# Patient Record
Sex: MALE | Race: WHITE | Employment: FULL TIME | URBAN - METROPOLITAN AREA
[De-identification: names, ages, dates, MRNs, and addresses within clinical notes are randomized per-mention and may not be internally consistent; named-entity substitution may affect disease eponyms.]

---

## 2019-03-01 PROCEDURE — 88305 TISSUE EXAM BY PATHOLOGIST: CPT | Performed by: INTERNAL MEDICINE

## 2024-08-06 RX ORDER — DUTASTERIDE 0.5 MG/1
0.5 CAPSULE, LIQUID FILLED ORAL NIGHTLY
COMMUNITY
Start: 2024-07-30

## 2024-08-07 ENCOUNTER — EKG ENCOUNTER (OUTPATIENT)
Dept: LAB | Age: 69
End: 2024-08-07
Attending: UROLOGY
Payer: MEDICARE

## 2024-08-07 ENCOUNTER — LABORATORY ENCOUNTER (OUTPATIENT)
Dept: LAB | Age: 69
End: 2024-08-07
Attending: UROLOGY
Payer: MEDICARE

## 2024-08-07 DIAGNOSIS — N40.1 BENIGN LOCALIZED HYPERPLASIA OF PROSTATE WITH URINARY OBSTRUCTION AND LOWER URINARY TRACT SYMPTOMS: ICD-10-CM

## 2024-08-07 DIAGNOSIS — N13.9 BENIGN LOCALIZED HYPERPLASIA OF PROSTATE WITH URINARY OBSTRUCTION AND LOWER URINARY TRACT SYMPTOMS: ICD-10-CM

## 2024-08-07 LAB
ALBUMIN SERPL-MCNC: 4.5 G/DL (ref 3.2–4.8)
ALBUMIN/GLOB SERPL: 1.7 {RATIO} (ref 1–2)
ALP LIVER SERPL-CCNC: 71 U/L
ALT SERPL-CCNC: 30 U/L
ANION GAP SERPL CALC-SCNC: 6 MMOL/L (ref 0–18)
ANTIBODY SCREEN: NEGATIVE
APTT PPP: 27.3 SECONDS (ref 23–36)
AST SERPL-CCNC: 24 U/L (ref ?–34)
ATRIAL RATE: 58 BPM
BASOPHILS # BLD AUTO: 0.03 X10(3) UL (ref 0–0.2)
BASOPHILS NFR BLD AUTO: 0.7 %
BILIRUB SERPL-MCNC: 0.7 MG/DL (ref 0.2–1.1)
BUN BLD-MCNC: 15 MG/DL (ref 9–23)
CALCIUM BLD-MCNC: 9.7 MG/DL (ref 8.7–10.4)
CHLORIDE SERPL-SCNC: 106 MMOL/L (ref 98–112)
CO2 SERPL-SCNC: 26 MMOL/L (ref 21–32)
CREAT BLD-MCNC: 0.93 MG/DL
EGFRCR SERPLBLD CKD-EPI 2021: 90 ML/MIN/1.73M2 (ref 60–?)
EOSINOPHIL # BLD AUTO: 0.07 X10(3) UL (ref 0–0.7)
EOSINOPHIL NFR BLD AUTO: 1.6 %
ERYTHROCYTE [DISTWIDTH] IN BLOOD BY AUTOMATED COUNT: 12.4 %
FASTING STATUS PATIENT QL REPORTED: YES
GLOBULIN PLAS-MCNC: 2.6 G/DL (ref 2–3.5)
GLUCOSE BLD-MCNC: 103 MG/DL (ref 70–99)
HCT VFR BLD AUTO: 45.4 %
HGB BLD-MCNC: 15.4 G/DL
IMM GRANULOCYTES # BLD AUTO: 0.01 X10(3) UL (ref 0–1)
IMM GRANULOCYTES NFR BLD: 0.2 %
INR BLD: 0.91 (ref 0.8–1.2)
LYMPHOCYTES # BLD AUTO: 1.25 X10(3) UL (ref 1–4)
LYMPHOCYTES NFR BLD AUTO: 27.7 %
MCH RBC QN AUTO: 31 PG (ref 26–34)
MCHC RBC AUTO-ENTMCNC: 33.9 G/DL (ref 31–37)
MCV RBC AUTO: 91.5 FL
MONOCYTES # BLD AUTO: 0.41 X10(3) UL (ref 0.1–1)
MONOCYTES NFR BLD AUTO: 9.1 %
NEUTROPHILS # BLD AUTO: 2.74 X10 (3) UL (ref 1.5–7.7)
NEUTROPHILS # BLD AUTO: 2.74 X10(3) UL (ref 1.5–7.7)
NEUTROPHILS NFR BLD AUTO: 60.7 %
OSMOLALITY SERPL CALC.SUM OF ELEC: 287 MOSM/KG (ref 275–295)
P AXIS: -22 DEGREES
P-R INTERVAL: 156 MS
PLATELET # BLD AUTO: 179 10(3)UL (ref 150–450)
POTASSIUM SERPL-SCNC: 4.4 MMOL/L (ref 3.5–5.1)
PROT SERPL-MCNC: 7.1 G/DL (ref 5.7–8.2)
PROTHROMBIN TIME: 12.2 SECONDS (ref 11.6–14.8)
Q-T INTERVAL: 428 MS
QRS DURATION: 88 MS
QTC CALCULATION (BEZET): 420 MS
R AXIS: 40 DEGREES
RBC # BLD AUTO: 4.96 X10(6)UL
RH BLOOD TYPE: POSITIVE
SODIUM SERPL-SCNC: 138 MMOL/L (ref 136–145)
T AXIS: 16 DEGREES
VENTRICULAR RATE: 58 BPM
WBC # BLD AUTO: 4.5 X10(3) UL (ref 4–11)

## 2024-08-07 PROCEDURE — 85610 PROTHROMBIN TIME: CPT

## 2024-08-07 PROCEDURE — 85025 COMPLETE CBC W/AUTO DIFF WBC: CPT

## 2024-08-07 PROCEDURE — 93010 ELECTROCARDIOGRAM REPORT: CPT | Performed by: INTERNAL MEDICINE

## 2024-08-07 PROCEDURE — 86850 RBC ANTIBODY SCREEN: CPT

## 2024-08-07 PROCEDURE — 93005 ELECTROCARDIOGRAM TRACING: CPT

## 2024-08-07 PROCEDURE — 85730 THROMBOPLASTIN TIME PARTIAL: CPT

## 2024-08-07 PROCEDURE — 36415 COLL VENOUS BLD VENIPUNCTURE: CPT

## 2024-08-07 PROCEDURE — 86900 BLOOD TYPING SEROLOGIC ABO: CPT

## 2024-08-07 PROCEDURE — 86901 BLOOD TYPING SEROLOGIC RH(D): CPT

## 2024-08-07 PROCEDURE — 80053 COMPREHEN METABOLIC PANEL: CPT

## 2024-08-13 ENCOUNTER — ANESTHESIA EVENT (OUTPATIENT)
Dept: SURGERY | Facility: HOSPITAL | Age: 69
End: 2024-08-13
Payer: MEDICARE

## 2024-08-13 NOTE — ANESTHESIA PREPROCEDURE EVALUATION
PRE-OP EVALUATION    Patient Name: Troy Vidal    Admit Diagnosis: BENIGN PROSTATIC HYPERPLASIA WITH OBSTRUCTION/LOWER URINARY TRACT SYMPTOMS, URINARY RETENTION    Pre-op Diagnosis: BENIGN PROSTATIC HYPERPLASIA WITH OBSTRUCTION/LOWER URINARY TRACT SYMPTOMS, URINARY RETENTION    XI ROBOT-ASSISTED LAPAROSCOPIC SIMPLE PROSTATECTOMY    Anesthesia Procedure: XI ROBOT-ASSISTED LAPAROSCOPIC SIMPLE PROSTATECTOMY    Surgeons and Role:     * Ben Ramires MD - Primary    Pre-op vitals reviewed.  Temp: 98.6 °F (37 °C)  Pulse: 65  Resp: 16  BP: 148/68  SpO2: 98 %  Body mass index is 29.6 kg/m².    Current medications reviewed.  Hospital Medications:  • acetaminophen (Tylenol Extra Strength) tab 1,000 mg  1,000 mg Oral Once   • lactated ringers infusion   Intravenous Continuous   • [COMPLETED] enoxaparin (Lovenox) 40 MG/0.4ML SUBQ injection 40 mg  40 mg Subcutaneous Once   • ceFAZolin (Ancef) 2g in 10mL IV syringe premix  2 g Intravenous Once       Outpatient Medications:     Medications Prior to Admission   Medication Sig Dispense Refill Last Dose   • tamsulosin 0.4 MG Oral Cap Take 1 capsule (0.4 mg total) by mouth daily.   8/13/2024 at 1900   • dutasteride 0.5 MG Oral Cap Take 1 capsule (0.5 mg total) by mouth nightly.   8/13/2024 at 1900   • Sildenafil Citrate (VIAGRA) 100 MG Oral Tab Take 1 tablet by mouth daily as needed for Erectile Dysfunction. 8 tablet 11        Allergies: Patient has no known allergies.      Anesthesia Evaluation    Patient summary reviewed.    Anesthetic Complications  (-) history of anesthetic complications         GI/Hepatic/Renal    Negative GI/hepatic/renal ROS.                             Cardiovascular      ECG reviewed.  Exercise tolerance: good         (+) obesity     (+) hyperlipidemia                                  Endo/Other    Negative endo/other ROS.                              Pulmonary    Negative pulmonary ROS.                       Neuro/Psych    Negative neuro/psych ROS.                                 Past Surgical History:   Procedure Laterality Date   • Colonoscopy N/A 3/1/2019    Procedure: COLONOSCOPY, POSSIBLE BIOPSY, POSSIBLE POLYPECTOMY 35435;  Surgeon: Sha Lord MD;  Location: [facility]   • Other surgical history  3/2/10    prostate bx-Dr. Love   • Skin surgery  2/8/12    MMS to Left malar cheek for BCC 2/8/12     Social History     Socioeconomic History   • Marital status:    Tobacco Use   • Smoking status: Never   • Smokeless tobacco: Never   Vaping Use   • Vaping status: Never Used   Substance and Sexual Activity   • Alcohol use: Yes     Comment: social   • Drug use: No     History   Drug Use No     Available pre-op labs reviewed.  Lab Results   Component Value Date    WBC 4.5 08/07/2024    RBC 4.96 08/07/2024    HGB 15.4 08/07/2024    HCT 45.4 08/07/2024    MCV 91.5 08/07/2024    MCH 31.0 08/07/2024    MCHC 33.9 08/07/2024    RDW 12.4 08/07/2024    .0 08/07/2024     Lab Results   Component Value Date     08/07/2024    K 4.4 08/07/2024     08/07/2024    CO2 26.0 08/07/2024    BUN 15 08/07/2024    CREATSERUM 0.93 08/07/2024     (H) 08/07/2024    CA 9.7 08/07/2024     Lab Results   Component Value Date    INR 0.91 08/07/2024         Airway      Mallampati: II  Mouth opening: 3 FB  TM distance: 4 - 6 cm  Neck ROM: full Cardiovascular    Cardiovascular exam normal.  Rhythm: regular  Rate: normal  (-) murmur   Dental    Dentition appears grossly intact         Pulmonary    Pulmonary exam normal.  Breath sounds clear to auscultation bilaterally.               Other findings        ASA: 2   Plan: general  NPO status verified and patient meets guidelines.    Post-procedure pain management plan discussed with surgeon and patient.      Plan/risks discussed with: patient and spouse            Present on Admission:  **None**

## 2024-08-14 ENCOUNTER — APPOINTMENT (OUTPATIENT)
Dept: GENERAL RADIOLOGY | Facility: HOSPITAL | Age: 69
End: 2024-08-14
Attending: NURSE PRACTITIONER
Payer: MEDICARE

## 2024-08-14 ENCOUNTER — HOSPITAL ENCOUNTER (INPATIENT)
Facility: HOSPITAL | Age: 69
LOS: 2 days | Discharge: HOME | End: 2024-08-16
Attending: UROLOGY | Admitting: UROLOGY
Payer: MEDICARE

## 2024-08-14 ENCOUNTER — ANESTHESIA (OUTPATIENT)
Dept: SURGERY | Facility: HOSPITAL | Age: 69
End: 2024-08-14
Payer: MEDICARE

## 2024-08-14 DIAGNOSIS — N13.9 BENIGN LOCALIZED HYPERPLASIA OF PROSTATE WITH URINARY OBSTRUCTION AND LOWER URINARY TRACT SYMPTOMS: Primary | ICD-10-CM

## 2024-08-14 DIAGNOSIS — N40.1 BENIGN LOCALIZED HYPERPLASIA OF PROSTATE WITH URINARY OBSTRUCTION AND LOWER URINARY TRACT SYMPTOMS: Primary | ICD-10-CM

## 2024-08-14 LAB
ALBUMIN SERPL-MCNC: 3.4 G/DL (ref 3.2–4.8)
ALBUMIN/GLOB SERPL: 1.5 {RATIO} (ref 1–2)
ALP LIVER SERPL-CCNC: 55 U/L
ALT SERPL-CCNC: 20 U/L
ANION GAP SERPL CALC-SCNC: 10 MMOL/L (ref 0–18)
ANION GAP SERPL CALC-SCNC: 10 MMOL/L (ref 0–18)
ARTERIAL PATENCY WRIST A: POSITIVE
AST SERPL-CCNC: 24 U/L (ref ?–34)
BASE EXCESS BLDA CALC-SCNC: -1.5 MMOL/L (ref ?–2)
BILIRUB SERPL-MCNC: 0.7 MG/DL (ref 0.2–1.1)
BODY TEMPERATURE: 98.6 F
BUN BLD-MCNC: 9 MG/DL (ref 9–23)
BUN BLD-MCNC: 9 MG/DL (ref 9–23)
CA-I BLD-SCNC: 1.1 MMOL/L (ref 0.95–1.32)
CALCIUM BLD-MCNC: 8.5 MG/DL (ref 8.7–10.4)
CALCIUM BLD-MCNC: 8.5 MG/DL (ref 8.7–10.4)
CHLORIDE SERPL-SCNC: 106 MMOL/L (ref 98–112)
CHLORIDE SERPL-SCNC: 106 MMOL/L (ref 98–112)
CO2 SERPL-SCNC: 21 MMOL/L (ref 21–32)
CO2 SERPL-SCNC: 21 MMOL/L (ref 21–32)
COHGB MFR BLD: 1.1 % SAT (ref 0–3)
CREAT BLD-MCNC: 1 MG/DL
CREAT BLD-MCNC: 1 MG/DL
EGFRCR SERPLBLD CKD-EPI 2021: 82 ML/MIN/1.73M2 (ref 60–?)
EGFRCR SERPLBLD CKD-EPI 2021: 82 ML/MIN/1.73M2 (ref 60–?)
ERYTHROCYTE [DISTWIDTH] IN BLOOD BY AUTOMATED COUNT: 12.4 %
FIO2: 100 %
GLOBULIN PLAS-MCNC: 2.3 G/DL (ref 2–3.5)
GLUCOSE BLD-MCNC: 202 MG/DL (ref 70–99)
GLUCOSE BLD-MCNC: 202 MG/DL (ref 70–99)
HCO3 BLDA-SCNC: 23.8 MEQ/L (ref 21–27)
HCT VFR BLD AUTO: 42 %
HGB BLD-MCNC: 13.3 G/DL
HGB BLD-MCNC: 14.1 G/DL
LACTATE BLD-SCNC: 3.5 MMOL/L (ref 0.5–2)
MCH RBC QN AUTO: 31.7 PG (ref 26–34)
MCHC RBC AUTO-ENTMCNC: 33.6 G/DL (ref 31–37)
MCV RBC AUTO: 94.4 FL
METHGB MFR BLD: 0.8 % SAT (ref 0.4–1.5)
MRSA DNA SPEC QL NAA+PROBE: NEGATIVE
OSMOLALITY SERPL CALC.SUM OF ELEC: 288 MOSM/KG (ref 275–295)
OSMOLALITY SERPL CALC.SUM OF ELEC: 288 MOSM/KG (ref 275–295)
OXYHGB MFR BLDA: 98.2 % (ref 92–100)
PCO2 BLDA: 37 MM HG (ref 35–45)
PEEP: 5 CM H2O
PH BLDA: 7.4 [PH] (ref 7.35–7.45)
PLATELET # BLD AUTO: 143 10(3)UL (ref 150–450)
PO2 BLDA: 346 MM HG (ref 80–100)
POTASSIUM BLD-SCNC: 3.5 MMOL/L (ref 3.6–5.1)
POTASSIUM SERPL-SCNC: 3.6 MMOL/L (ref 3.5–5.1)
POTASSIUM SERPL-SCNC: 3.6 MMOL/L (ref 3.5–5.1)
PROT SERPL-MCNC: 5.7 G/DL (ref 5.7–8.2)
RBC # BLD AUTO: 4.45 X10(6)UL
RH BLOOD TYPE: POSITIVE
SODIUM BLD-SCNC: 132 MMOL/L (ref 135–145)
SODIUM SERPL-SCNC: 137 MMOL/L (ref 136–145)
SODIUM SERPL-SCNC: 137 MMOL/L (ref 136–145)
TIDAL VOLUME: 600 ML
TRIGL SERPL-MCNC: 168 MG/DL (ref 30–149)
VENT RATE: 14 /MIN
WBC # BLD AUTO: 14.4 X10(3) UL (ref 4–11)

## 2024-08-14 PROCEDURE — 71045 X-RAY EXAM CHEST 1 VIEW: CPT | Performed by: NURSE PRACTITIONER

## 2024-08-14 PROCEDURE — 0VB04ZZ EXCISION OF PROSTATE, PERCUTANEOUS ENDOSCOPIC APPROACH: ICD-10-PCS | Performed by: UROLOGY

## 2024-08-14 PROCEDURE — 76942 ECHO GUIDE FOR BIOPSY: CPT | Performed by: ANESTHESIOLOGY

## 2024-08-14 PROCEDURE — 0BH17EZ INSERTION OF ENDOTRACHEAL AIRWAY INTO TRACHEA, VIA NATURAL OR ARTIFICIAL OPENING: ICD-10-PCS | Performed by: ANESTHESIOLOGY

## 2024-08-14 PROCEDURE — 3E0T3BZ INTRODUCTION OF ANESTHETIC AGENT INTO PERIPHERAL NERVES AND PLEXI, PERCUTANEOUS APPROACH: ICD-10-PCS | Performed by: ANESTHESIOLOGY

## 2024-08-14 PROCEDURE — 8E0W4CZ ROBOTIC ASSISTED PROCEDURE OF TRUNK REGION, PERCUTANEOUS ENDOSCOPIC APPROACH: ICD-10-PCS | Performed by: UROLOGY

## 2024-08-14 PROCEDURE — 99291 CRITICAL CARE FIRST HOUR: CPT | Performed by: NURSE PRACTITIONER

## 2024-08-14 PROCEDURE — 5A1935Z RESPIRATORY VENTILATION, LESS THAN 24 CONSECUTIVE HOURS: ICD-10-PCS | Performed by: INTERNAL MEDICINE

## 2024-08-14 DEVICE — POLYURETHANE FEEDING TUBE,RADIOPAQUE LINE, SAFE ENTERAL CONNECTIONS
Type: IMPLANTABLE DEVICE | Site: ABDOMEN
Brand: KANGAROO

## 2024-08-14 RX ORDER — ACETAMINOPHEN 10 MG/ML
1000 INJECTION, SOLUTION INTRAVENOUS ONCE
Status: DISCONTINUED | OUTPATIENT
Start: 2024-08-14 | End: 2024-08-14

## 2024-08-14 RX ORDER — LABETALOL HYDROCHLORIDE 5 MG/ML
5 INJECTION, SOLUTION INTRAVENOUS EVERY 5 MIN PRN
Status: DISCONTINUED | OUTPATIENT
Start: 2024-08-14 | End: 2024-08-14

## 2024-08-14 RX ORDER — MAGNESIUM HYDROXIDE 1200 MG/15ML
3000 LIQUID ORAL CONTINUOUS
Status: DISCONTINUED | OUTPATIENT
Start: 2024-08-14 | End: 2024-08-16

## 2024-08-14 RX ORDER — SENNOSIDES 8.6 MG
17.2 TABLET ORAL NIGHTLY
Status: DISCONTINUED | OUTPATIENT
Start: 2024-08-14 | End: 2024-08-16

## 2024-08-14 RX ORDER — OXYCODONE HYDROCHLORIDE 5 MG/1
5 TABLET ORAL EVERY 4 HOURS PRN
Status: DISCONTINUED | OUTPATIENT
Start: 2024-08-14 | End: 2024-08-16

## 2024-08-14 RX ORDER — TAMSULOSIN HYDROCHLORIDE 0.4 MG/1
0.4 CAPSULE ORAL DAILY
COMMUNITY
Start: 2024-07-25 | End: 2025-07-30

## 2024-08-14 RX ORDER — ACETAMINOPHEN 325 MG/1
650 TABLET ORAL EVERY 4 HOURS PRN
Status: DISCONTINUED | OUTPATIENT
Start: 2024-08-14 | End: 2024-08-16

## 2024-08-14 RX ORDER — ONDANSETRON 2 MG/ML
4 INJECTION INTRAMUSCULAR; INTRAVENOUS EVERY 6 HOURS PRN
Status: DISCONTINUED | OUTPATIENT
Start: 2024-08-14 | End: 2024-08-14

## 2024-08-14 RX ORDER — MIDAZOLAM HYDROCHLORIDE 1 MG/ML
INJECTION INTRAMUSCULAR; INTRAVENOUS AS NEEDED
Status: DISCONTINUED | OUTPATIENT
Start: 2024-08-14 | End: 2024-08-14 | Stop reason: SURG

## 2024-08-14 RX ORDER — ROCURONIUM BROMIDE 10 MG/ML
INJECTION, SOLUTION INTRAVENOUS AS NEEDED
Status: DISCONTINUED | OUTPATIENT
Start: 2024-08-14 | End: 2024-08-14 | Stop reason: SURG

## 2024-08-14 RX ORDER — DIPHENHYDRAMINE HYDROCHLORIDE 50 MG/ML
12.5 INJECTION INTRAMUSCULAR; INTRAVENOUS AS NEEDED
Status: DISCONTINUED | OUTPATIENT
Start: 2024-08-14 | End: 2024-08-14

## 2024-08-14 RX ORDER — MIDAZOLAM HYDROCHLORIDE 1 MG/ML
2 INJECTION INTRAMUSCULAR; INTRAVENOUS EVERY 5 MIN PRN
Status: DISCONTINUED | OUTPATIENT
Start: 2024-08-14 | End: 2024-08-15

## 2024-08-14 RX ORDER — HYDROMORPHONE HYDROCHLORIDE 1 MG/ML
0.4 INJECTION, SOLUTION INTRAMUSCULAR; INTRAVENOUS; SUBCUTANEOUS EVERY 5 MIN PRN
Status: DISCONTINUED | OUTPATIENT
Start: 2024-08-14 | End: 2024-08-14

## 2024-08-14 RX ORDER — METOCLOPRAMIDE HYDROCHLORIDE 5 MG/ML
10 INJECTION INTRAMUSCULAR; INTRAVENOUS EVERY 8 HOURS PRN
Status: DISCONTINUED | OUTPATIENT
Start: 2024-08-14 | End: 2024-08-16

## 2024-08-14 RX ORDER — LIDOCAINE HYDROCHLORIDE 10 MG/ML
INJECTION, SOLUTION EPIDURAL; INFILTRATION; INTRACAUDAL; PERINEURAL AS NEEDED
Status: DISCONTINUED | OUTPATIENT
Start: 2024-08-14 | End: 2024-08-14 | Stop reason: SURG

## 2024-08-14 RX ORDER — SODIUM PHOSPHATE, DIBASIC AND SODIUM PHOSPHATE, MONOBASIC 7; 19 G/230ML; G/230ML
1 ENEMA RECTAL ONCE AS NEEDED
Status: DISCONTINUED | OUTPATIENT
Start: 2024-08-14 | End: 2024-08-16

## 2024-08-14 RX ORDER — ENOXAPARIN SODIUM 100 MG/ML
40 INJECTION SUBCUTANEOUS ONCE
Status: COMPLETED | OUTPATIENT
Start: 2024-08-14 | End: 2024-08-14

## 2024-08-14 RX ORDER — EPHEDRINE SULFATE 50 MG/ML
INJECTION INTRAVENOUS AS NEEDED
Status: DISCONTINUED | OUTPATIENT
Start: 2024-08-14 | End: 2024-08-14 | Stop reason: SURG

## 2024-08-14 RX ORDER — DEXTROSE, SODIUM CHLORIDE, SODIUM LACTATE, POTASSIUM CHLORIDE, AND CALCIUM CHLORIDE 5; .6; .31; .03; .02 G/100ML; G/100ML; G/100ML; G/100ML; G/100ML
INJECTION, SOLUTION INTRAVENOUS CONTINUOUS
Status: DISCONTINUED | OUTPATIENT
Start: 2024-08-14 | End: 2024-08-14

## 2024-08-14 RX ORDER — HYDROMORPHONE HYDROCHLORIDE 1 MG/ML
0.2 INJECTION, SOLUTION INTRAMUSCULAR; INTRAVENOUS; SUBCUTANEOUS EVERY 5 MIN PRN
Status: DISCONTINUED | OUTPATIENT
Start: 2024-08-14 | End: 2024-08-14

## 2024-08-14 RX ORDER — MELATONIN
3 NIGHTLY PRN
Status: DISCONTINUED | OUTPATIENT
Start: 2024-08-14 | End: 2024-08-16

## 2024-08-14 RX ORDER — HYDROMORPHONE HYDROCHLORIDE 1 MG/ML
0.4 INJECTION, SOLUTION INTRAMUSCULAR; INTRAVENOUS; SUBCUTANEOUS EVERY 2 HOUR PRN
Status: DISCONTINUED | OUTPATIENT
Start: 2024-08-14 | End: 2024-08-16

## 2024-08-14 RX ORDER — SENNOSIDES 8.8 MG/5ML
10 LIQUID ORAL NIGHTLY PRN
Status: DISCONTINUED | OUTPATIENT
Start: 2024-08-14 | End: 2024-08-16

## 2024-08-14 RX ORDER — ACETAMINOPHEN 650 MG/1
650 SUPPOSITORY RECTAL EVERY 4 HOURS PRN
Status: DISCONTINUED | OUTPATIENT
Start: 2024-08-14 | End: 2024-08-16

## 2024-08-14 RX ORDER — NALOXONE HYDROCHLORIDE 0.4 MG/ML
0.08 INJECTION, SOLUTION INTRAMUSCULAR; INTRAVENOUS; SUBCUTANEOUS AS NEEDED
Status: DISCONTINUED | OUTPATIENT
Start: 2024-08-14 | End: 2024-08-14

## 2024-08-14 RX ORDER — METOCLOPRAMIDE HYDROCHLORIDE 5 MG/ML
10 INJECTION INTRAMUSCULAR; INTRAVENOUS EVERY 8 HOURS PRN
Status: DISCONTINUED | OUTPATIENT
Start: 2024-08-14 | End: 2024-08-14

## 2024-08-14 RX ORDER — HYDROCODONE BITARTRATE AND ACETAMINOPHEN 5; 325 MG/1; MG/1
2 TABLET ORAL ONCE AS NEEDED
Status: ACTIVE | OUTPATIENT
Start: 2024-08-14 | End: 2024-08-14

## 2024-08-14 RX ORDER — KETAMINE HYDROCHLORIDE 50 MG/ML
INJECTION, SOLUTION INTRAMUSCULAR; INTRAVENOUS AS NEEDED
Status: DISCONTINUED | OUTPATIENT
Start: 2024-08-14 | End: 2024-08-14 | Stop reason: SURG

## 2024-08-14 RX ORDER — POLYETHYLENE GLYCOL 3350 17 G/17G
17 POWDER, FOR SOLUTION ORAL DAILY PRN
Status: DISCONTINUED | OUTPATIENT
Start: 2024-08-14 | End: 2024-08-16

## 2024-08-14 RX ORDER — HYDROCODONE BITARTRATE AND ACETAMINOPHEN 5; 325 MG/1; MG/1
1 TABLET ORAL ONCE AS NEEDED
Status: ACTIVE | OUTPATIENT
Start: 2024-08-14 | End: 2024-08-14

## 2024-08-14 RX ORDER — LIDOCAINE HYDROCHLORIDE ANHYDROUS AND DEXTROSE MONOHYDRATE .8; 5 G/100ML; G/100ML
INJECTION, SOLUTION INTRAVENOUS CONTINUOUS PRN
Status: DISCONTINUED | OUTPATIENT
Start: 2024-08-14 | End: 2024-08-14 | Stop reason: SURG

## 2024-08-14 RX ORDER — METOCLOPRAMIDE HYDROCHLORIDE 5 MG/ML
INJECTION INTRAMUSCULAR; INTRAVENOUS AS NEEDED
Status: DISCONTINUED | OUTPATIENT
Start: 2024-08-14 | End: 2024-08-14 | Stop reason: SURG

## 2024-08-14 RX ORDER — ONDANSETRON 2 MG/ML
4 INJECTION INTRAMUSCULAR; INTRAVENOUS EVERY 6 HOURS PRN
Status: DISCONTINUED | OUTPATIENT
Start: 2024-08-14 | End: 2024-08-16

## 2024-08-14 RX ORDER — CHLORHEXIDINE GLUCONATE ORAL RINSE 1.2 MG/ML
15 SOLUTION DENTAL
Status: DISCONTINUED | OUTPATIENT
Start: 2024-08-14 | End: 2024-08-15

## 2024-08-14 RX ORDER — HYDROMORPHONE HYDROCHLORIDE 1 MG/ML
0.6 INJECTION, SOLUTION INTRAMUSCULAR; INTRAVENOUS; SUBCUTANEOUS EVERY 5 MIN PRN
Status: DISCONTINUED | OUTPATIENT
Start: 2024-08-14 | End: 2024-08-14

## 2024-08-14 RX ORDER — ACETAMINOPHEN 160 MG/5ML
650 SOLUTION ORAL EVERY 4 HOURS PRN
Status: DISCONTINUED | OUTPATIENT
Start: 2024-08-14 | End: 2024-08-16

## 2024-08-14 RX ORDER — METHYLPREDNISOLONE SODIUM SUCCINATE 125 MG/2ML
INJECTION, POWDER, LYOPHILIZED, FOR SOLUTION INTRAMUSCULAR; INTRAVENOUS AS NEEDED
Status: DISCONTINUED | OUTPATIENT
Start: 2024-08-14 | End: 2024-08-14 | Stop reason: SURG

## 2024-08-14 RX ORDER — ACETAMINOPHEN 10 MG/ML
INJECTION, SOLUTION INTRAVENOUS
Status: COMPLETED
Start: 2024-08-14 | End: 2024-08-14

## 2024-08-14 RX ORDER — SODIUM CHLORIDE, SODIUM LACTATE, POTASSIUM CHLORIDE, CALCIUM CHLORIDE 600; 310; 30; 20 MG/100ML; MG/100ML; MG/100ML; MG/100ML
INJECTION, SOLUTION INTRAVENOUS CONTINUOUS PRN
Status: DISCONTINUED | OUTPATIENT
Start: 2024-08-14 | End: 2024-08-14 | Stop reason: SURG

## 2024-08-14 RX ORDER — ACETAMINOPHEN 500 MG
1000 TABLET ORAL ONCE AS NEEDED
Status: ACTIVE | OUTPATIENT
Start: 2024-08-14 | End: 2024-08-14

## 2024-08-14 RX ORDER — OXYBUTYNIN CHLORIDE 5 MG/1
5 TABLET ORAL EVERY 6 HOURS PRN
Status: DISCONTINUED | OUTPATIENT
Start: 2024-08-14 | End: 2024-08-16

## 2024-08-14 RX ORDER — KETOROLAC TROMETHAMINE 30 MG/ML
INJECTION, SOLUTION INTRAMUSCULAR; INTRAVENOUS AS NEEDED
Status: DISCONTINUED | OUTPATIENT
Start: 2024-08-14 | End: 2024-08-14 | Stop reason: SURG

## 2024-08-14 RX ORDER — ACETAMINOPHEN 500 MG
1000 TABLET ORAL ONCE
Status: DISCONTINUED | OUTPATIENT
Start: 2024-08-14 | End: 2024-08-14 | Stop reason: HOSPADM

## 2024-08-14 RX ORDER — GLYCOPYRROLATE 0.2 MG/ML
INJECTION, SOLUTION INTRAMUSCULAR; INTRAVENOUS AS NEEDED
Status: DISCONTINUED | OUTPATIENT
Start: 2024-08-14 | End: 2024-08-14 | Stop reason: SURG

## 2024-08-14 RX ORDER — MEPERIDINE HYDROCHLORIDE 25 MG/ML
12.5 INJECTION INTRAMUSCULAR; INTRAVENOUS; SUBCUTANEOUS AS NEEDED
Status: DISCONTINUED | OUTPATIENT
Start: 2024-08-14 | End: 2024-08-16

## 2024-08-14 RX ORDER — NEOSTIGMINE METHYLSULFATE 1 MG/ML
INJECTION INTRAVENOUS AS NEEDED
Status: DISCONTINUED | OUTPATIENT
Start: 2024-08-14 | End: 2024-08-14 | Stop reason: SURG

## 2024-08-14 RX ORDER — ONDANSETRON 2 MG/ML
INJECTION INTRAMUSCULAR; INTRAVENOUS AS NEEDED
Status: DISCONTINUED | OUTPATIENT
Start: 2024-08-14 | End: 2024-08-14 | Stop reason: SURG

## 2024-08-14 RX ORDER — BUPIVACAINE HYDROCHLORIDE 2.5 MG/ML
INJECTION, SOLUTION EPIDURAL; INFILTRATION; INTRACAUDAL AS NEEDED
Status: DISCONTINUED | OUTPATIENT
Start: 2024-08-14 | End: 2024-08-14 | Stop reason: HOSPADM

## 2024-08-14 RX ORDER — DOCUSATE SODIUM 100 MG/1
100 CAPSULE, LIQUID FILLED ORAL 2 TIMES DAILY
Status: DISCONTINUED | OUTPATIENT
Start: 2024-08-14 | End: 2024-08-16

## 2024-08-14 RX ORDER — DEXMEDETOMIDINE HYDROCHLORIDE 4 UG/ML
INJECTION, SOLUTION INTRAVENOUS CONTINUOUS
Status: DISCONTINUED | OUTPATIENT
Start: 2024-08-14 | End: 2024-08-15

## 2024-08-14 RX ORDER — DEXAMETHASONE SODIUM PHOSPHATE 4 MG/ML
VIAL (ML) INJECTION AS NEEDED
Status: DISCONTINUED | OUTPATIENT
Start: 2024-08-14 | End: 2024-08-14 | Stop reason: SURG

## 2024-08-14 RX ORDER — BISACODYL 10 MG
10 SUPPOSITORY, RECTAL RECTAL
Status: DISCONTINUED | OUTPATIENT
Start: 2024-08-14 | End: 2024-08-16

## 2024-08-14 RX ORDER — SODIUM CHLORIDE, SODIUM LACTATE, POTASSIUM CHLORIDE, CALCIUM CHLORIDE 600; 310; 30; 20 MG/100ML; MG/100ML; MG/100ML; MG/100ML
INJECTION, SOLUTION INTRAVENOUS CONTINUOUS
Status: DISCONTINUED | OUTPATIENT
Start: 2024-08-14 | End: 2024-08-14

## 2024-08-14 RX ORDER — OXYCODONE HYDROCHLORIDE 5 MG/1
2.5 TABLET ORAL EVERY 4 HOURS PRN
Status: DISCONTINUED | OUTPATIENT
Start: 2024-08-14 | End: 2024-08-16

## 2024-08-14 RX ORDER — TAMSULOSIN HYDROCHLORIDE 0.4 MG/1
0.4 CAPSULE ORAL DAILY
Status: DISCONTINUED | OUTPATIENT
Start: 2024-08-14 | End: 2024-08-16

## 2024-08-14 RX ORDER — HYDROMORPHONE HYDROCHLORIDE 1 MG/ML
0.2 INJECTION, SOLUTION INTRAMUSCULAR; INTRAVENOUS; SUBCUTANEOUS EVERY 2 HOUR PRN
Status: DISCONTINUED | OUTPATIENT
Start: 2024-08-14 | End: 2024-08-16

## 2024-08-14 RX ORDER — ENOXAPARIN SODIUM 100 MG/ML
40 INJECTION SUBCUTANEOUS DAILY
Status: DISCONTINUED | OUTPATIENT
Start: 2024-08-15 | End: 2024-08-16

## 2024-08-14 RX ORDER — ACETAMINOPHEN 10 MG/ML
1000 INJECTION, SOLUTION INTRAVENOUS EVERY 6 HOURS PRN
Status: DISCONTINUED | OUTPATIENT
Start: 2024-08-14 | End: 2024-08-16

## 2024-08-14 RX ORDER — POLYETHYLENE GLYCOL 3350 17 G/17G
17 POWDER, FOR SOLUTION ORAL DAILY PRN
Status: DISCONTINUED | OUTPATIENT
Start: 2024-08-14 | End: 2024-08-14

## 2024-08-14 RX ORDER — SODIUM CHLORIDE, SODIUM LACTATE, POTASSIUM CHLORIDE, CALCIUM CHLORIDE 600; 310; 30; 20 MG/100ML; MG/100ML; MG/100ML; MG/100ML
INJECTION, SOLUTION INTRAVENOUS CONTINUOUS
Status: DISCONTINUED | OUTPATIENT
Start: 2024-08-14 | End: 2024-08-16

## 2024-08-14 RX ORDER — BUPIVACAINE HYDROCHLORIDE AND EPINEPHRINE 2.5; 5 MG/ML; UG/ML
INJECTION, SOLUTION EPIDURAL; INFILTRATION; INTRACAUDAL; PERINEURAL AS NEEDED
Status: DISCONTINUED | OUTPATIENT
Start: 2024-08-14 | End: 2024-08-14 | Stop reason: SURG

## 2024-08-14 RX ORDER — PHENYLEPHRINE HCL 10 MG/ML
VIAL (ML) INJECTION AS NEEDED
Status: DISCONTINUED | OUTPATIENT
Start: 2024-08-14 | End: 2024-08-14 | Stop reason: SURG

## 2024-08-14 RX ADMIN — SODIUM CHLORIDE, SODIUM LACTATE, POTASSIUM CHLORIDE, CALCIUM CHLORIDE: 600; 310; 30; 20 INJECTION, SOLUTION INTRAVENOUS at 14:57:00

## 2024-08-14 RX ADMIN — ROCURONIUM BROMIDE 15 MG: 10 INJECTION, SOLUTION INTRAVENOUS at 15:18:00

## 2024-08-14 RX ADMIN — PHENYLEPHRINE HCL 100 MCG: 10 MG/ML VIAL (ML) INJECTION at 17:31:00

## 2024-08-14 RX ADMIN — ONDANSETRON 4 MG: 2 INJECTION INTRAMUSCULAR; INTRAVENOUS at 15:37:00

## 2024-08-14 RX ADMIN — SODIUM CHLORIDE, SODIUM LACTATE, POTASSIUM CHLORIDE, CALCIUM CHLORIDE: 600; 310; 30; 20 INJECTION, SOLUTION INTRAVENOUS at 17:05:00

## 2024-08-14 RX ADMIN — BUPIVACAINE HYDROCHLORIDE AND EPINEPHRINE 30 ML: 2.5; 5 INJECTION, SOLUTION EPIDURAL; INFILTRATION; INTRACAUDAL; PERINEURAL at 16:17:00

## 2024-08-14 RX ADMIN — GLYCOPYRROLATE 0.8 MG: 0.2 INJECTION, SOLUTION INTRAMUSCULAR; INTRAVENOUS at 16:19:00

## 2024-08-14 RX ADMIN — DEXAMETHASONE SODIUM PHOSPHATE 8 MG: 4 MG/ML VIAL (ML) INJECTION at 13:10:00

## 2024-08-14 RX ADMIN — ROCURONIUM BROMIDE 20 MG: 10 INJECTION, SOLUTION INTRAVENOUS at 13:41:00

## 2024-08-14 RX ADMIN — PHENYLEPHRINE HCL 100 MCG: 10 MG/ML VIAL (ML) INJECTION at 17:35:00

## 2024-08-14 RX ADMIN — LIDOCAINE HYDROCHLORIDE ANHYDROUS AND DEXTROSE MONOHYDRATE 2 MG/KG/HR: .8; 5 INJECTION, SOLUTION INTRAVENOUS at 13:05:00

## 2024-08-14 RX ADMIN — SODIUM CHLORIDE, SODIUM LACTATE, POTASSIUM CHLORIDE, CALCIUM CHLORIDE: 600; 310; 30; 20 INJECTION, SOLUTION INTRAVENOUS at 13:00:00

## 2024-08-14 RX ADMIN — MIDAZOLAM HYDROCHLORIDE 1 MG: 1 INJECTION INTRAMUSCULAR; INTRAVENOUS at 12:44:00

## 2024-08-14 RX ADMIN — PHENYLEPHRINE HCL 100 MCG: 10 MG/ML VIAL (ML) INJECTION at 17:28:00

## 2024-08-14 RX ADMIN — ROCURONIUM BROMIDE 50 MG: 10 INJECTION, SOLUTION INTRAVENOUS at 12:52:00

## 2024-08-14 RX ADMIN — KETAMINE HYDROCHLORIDE 25 MG: 50 INJECTION, SOLUTION INTRAMUSCULAR; INTRAVENOUS at 13:10:00

## 2024-08-14 RX ADMIN — METOCLOPRAMIDE HYDROCHLORIDE 20 MG: 5 INJECTION INTRAMUSCULAR; INTRAVENOUS at 13:10:00

## 2024-08-14 RX ADMIN — EPHEDRINE SULFATE 5 MG: 50 INJECTION INTRAVENOUS at 13:11:00

## 2024-08-14 RX ADMIN — KETOROLAC TROMETHAMINE 30 MG: 30 INJECTION, SOLUTION INTRAMUSCULAR; INTRAVENOUS at 15:44:00

## 2024-08-14 RX ADMIN — BUPIVACAINE HYDROCHLORIDE AND EPINEPHRINE 30 ML: 2.5; 5 INJECTION, SOLUTION EPIDURAL; INFILTRATION; INTRACAUDAL; PERINEURAL at 16:14:00

## 2024-08-14 RX ADMIN — PHENYLEPHRINE HCL 100 MCG: 10 MG/ML VIAL (ML) INJECTION at 17:33:00

## 2024-08-14 RX ADMIN — METHYLPREDNISOLONE SODIUM SUCCINATE 125 MG: 125 INJECTION, POWDER, LYOPHILIZED, FOR SOLUTION INTRAMUSCULAR; INTRAVENOUS at 16:48:00

## 2024-08-14 RX ADMIN — LIDOCAINE HYDROCHLORIDE 50 MG: 10 INJECTION, SOLUTION EPIDURAL; INFILTRATION; INTRACAUDAL; PERINEURAL at 12:51:00

## 2024-08-14 RX ADMIN — ROCURONIUM BROMIDE 20 MG: 10 INJECTION, SOLUTION INTRAVENOUS at 14:50:00

## 2024-08-14 RX ADMIN — SODIUM CHLORIDE, SODIUM LACTATE, POTASSIUM CHLORIDE, CALCIUM CHLORIDE: 600; 310; 30; 20 INJECTION, SOLUTION INTRAVENOUS at 12:44:00

## 2024-08-14 RX ADMIN — ROCURONIUM BROMIDE 20 MG: 10 INJECTION, SOLUTION INTRAVENOUS at 17:29:00

## 2024-08-14 RX ADMIN — NEOSTIGMINE METHYLSULFATE 5 MG: 1 INJECTION INTRAVENOUS at 16:19:00

## 2024-08-14 RX ADMIN — MIDAZOLAM HYDROCHLORIDE 1 MG: 1 INJECTION INTRAMUSCULAR; INTRAVENOUS at 12:48:00

## 2024-08-14 RX ADMIN — MIDAZOLAM HYDROCHLORIDE 2 MG: 1 INJECTION INTRAMUSCULAR; INTRAVENOUS at 17:31:00

## 2024-08-14 NOTE — H&P
Urology Pre OP H&P  Encounter Date: 8/14/2024    Chief Complaint:   BPH with LUTS and urinary retention.    History of Present Illness:   Troy Vidal is a 69 year old male who presents today for evaluation of BPH with LUTS. He is referred by Dr. Gordon Garcia to discuss surgical options for his significant BPH.     He has seen Dr. Love and Dr. Garcia in the past. Prostate MRI in August, 2023 showed an ~190 mL gland, and was read as PI-RADS II -low probability for clinically significant prostate cancer. He has a history of an elevated PSA.    He recently developed urinary retention, necessitating rosales placement. He was started on tamsulosin and dutasteride recently by Dr. Garcia. He had a rosales placed, with drainage of ~900mL of urine in the office yesterday.    Dr. Garcia recommended he meets with me to discuss a robotic-assisted lap. Simple prostatectomy.     He denies a prior abdominal surgical history. He does not take any antiplatelet medications or anticoagulants.     Past Medical History:   Diagnosis Date   BPH (benign prostatic hyperplasia)   Elevated PSA     Past Surgical History:   Procedure Laterality Date   COLONOSCOPY N/A 3/1/2019   Procedure: COLONOSCOPY, POSSIBLE BIOPSY, POSSIBLE POLYPECTOMY 48273; Surgeon: Sha Lord MD; Location: [facility]   OTHER SURGICAL HISTORY 3/2/10   prostate bx-Dr. Love   SKIN SURGERY 2/8/12   MMS to Left malar cheek for BCC 2/8/12     Allergies:  Patient has no known allergies.  Current Outpatient Medications   Medication Sig Dispense Refill   tamsulosin (Flomax) cap Take 0.4 mg by mouth once.   tamsulosin (Flomax) cap Take 1 capsule (0.4 mg total) by mouth daily. WITH DINNER 90 capsule 3   dutasteride 0.5 MG Oral Cap Take 1 capsule (0.5 mg total) by mouth nightly. 90 capsule 3   Multiple Vitamin (DAILY VITES) Oral Tab Take 1 tablet by mouth daily.   Sildenafil Citrate (VIAGRA) 100 MG Oral Tab Take 1 tablet (100 mg total) by mouth as needed for Erectile  Dysfunction. 30 tablet 11   Sildenafil Citrate (VIAGRA) 100 MG Oral Tab Take 1 tablet by mouth daily as needed for Erectile Dysfunction. 8 tablet 11     Social History:  He reports that he has never smoked. He has never used smokeless tobacco. He reports current alcohol use. He reports that he does not use drugs.    Family History   Problem Relation Age of Onset   Heart Disorder Father   Cancer Mother     Review of Systems:   General: Denies anorexia, weight loss, fevers, chills, night sweats, or other constitutional symptoms.   Neurologic: Denies headaches, stiff neck, photophobia, numbness, or weakness of extremities.   Psychiatric: Denies anxiety, depression.  Eyes: Denies vision changes.  Skin: Denies skin changes or rash.  Cardiac: Denies chest pain, palpitations, or orthopnea.  Pulmonary: Denies cough, hemoptysis, shortness of breath, or dyspnea on exertion.  GI: Denies abdominal pain, nausea, vomiting, or changes in bowel movements.  Musculoskeletal: Denies extremity pain, weakness.  : See HPI.    Physical Examination:   There were no vitals taken for this visit.  There is no height or weight on file to calculate BMI.  General: Awake, Alert, Oriented. Well-nourished. Appears stated age.  Neurologic: No focal deficits. Normal gait.  HEENT: EOMI. No scleral icterus.  Cardiac: Regular rate and rhythm.  Respiratory: Non-labored respirations.  Abdomen: Soft, Non-tender, non-distended. No palpable masses. No costovertebral angle tenderness.  Extremities: Warm, well-perfused. No palpable edema.  Skin: Normal-appearing. No rash or lesions.  Genitourinary: Anaya in place draining yellow urine.     Laboratory Review:   Component  Latest Ref Rng 7/18/2023 7/21/2023 7/29/2024   Glucose Urine  Negative mg/dL Negative   Bilirubin Urine  Negative Negative   Ketones Urine  Negative - Trace mg/dL Negative   Specific Gravity  1.006 - 1.029 1.020   Blood Urine  Negative Negative   PH Urine  5.0 - 8.0 5.5   Protein  Urine  Negative - Trace mg/dL Negative   Urobilinogen Urine  0.2 - 1.0 mg/dL 0.2   Nitrite Urine  Negative Negative   Leukocyte Esterase Urine  Negative Negative   Color Urine  Light Yellow - Yellow Yellow   Clarity Urine  Clear Clear   Multistix Lot#  Numeric [id number]   Multistix Expiration Date  Date 7/2,024   PSA  0.01 - 4 ng/mL 6.51 (H) 7.52 (H)     Radiology Review:   I have personally reviewed all pertinent imaging.    Prostate MRI 8/12/2023:  PI-RADS II: Low (clinically significant cancer is unlikely to be present) 189.7mL gland.    Assessment:   In summary, Troy Vidal is a 69 year old male with BPH with LUTS and urinary retention.    Plan:   We reviewed surgical options for men with symptomatic BPH. He has a significantly enlarged gland, measuring ~190 mL on a prostate MRI from late 2023. We discussed that appropriate options for men with a gland his size include a HoLEP, Aquablation, or a robotic-assisted lap. Simple prostatectomy.     To OR for robotic-assisted lap. Simple prostatectomy. This is a procedure in which the BPH tissue is enucleated transvesically. This procedure is able to address men with very large glands effectively    Risks include bleeding, infection, damage to surrounding structures including the bladder, rectum, urethra, or ureters, the risk of urinary fistula, bladder neck contracture, urethral stricture, urinary incontinence, erectile dysfunction, failure of the procedure to alleviate his LUTS or retention, as well as the risks of general anesthesia.     I have discussed the risks, benefits and alternatives to the proposed procedure/treatment plan with the patient.  Our discussion also included the risks and benefits of the alternatives treatment options, including doing nothing. They were encouraged to ask questions and all questions were answered to their satisfaction.  At the end of our discussion they gave their verbal consent to the proposed procedure/treatment plan.    Ben  MD Keyla  [facility]  Office: [phone]

## 2024-08-14 NOTE — PROGRESS NOTES
Urology Update Note:    Upon extubation, patient had difficulties with breathing.  He was reintubated by anesthesia.  He will be transferred to the ICU post-op for overnight observation.  Anesthesia recommended an ENT consult for further assessment of his airway.      I updated his wife and evaluated the patient in the ICU. Post-op labs noted and appear appropriate. His urine is light pink on a moderate CBI drip currently.     His wife is aware of the plan to keep him intubated overnight and to have ENT assess him.    Ben Ramires MD  [facility]  Department of Urology  Office: [phone]

## 2024-08-14 NOTE — ANESTHESIA POSTPROCEDURE EVALUATION
[facility]    Troy ANGEL Young Patient Status:  Outpatient in a Bed   Age/Gender 69 year old male MRN [id number]   Location [facility] 4SW-A Attending Ben Ramires MD   Hosp Day # 0 PCP TROY OJEDA       Anesthesia Post-op Note    XI ROBOT-ASSISTED LAPAROSCOPIC SIMPLE PROSTATECTOMY    Procedure Summary       Date: 08/14/24 Room / Location:  MAIN OR  /  MAIN OR    Anesthesia Start: 1244 Anesthesia Stop: 1745    Procedure: XI ROBOT-ASSISTED LAPAROSCOPIC SIMPLE PROSTATECTOMY (Abdomen) Diagnosis: (BENIGN PROSTATIC HYPERPLASIA WITH OBSTRUCTION/LOWER URINARY TRACT SYMPTOMS, URINARY RETENTION)    Surgeons: Ben Ramires MD Anesthesiologist: Jonatan Kenyon MD    Anesthesia Type: general ASA Status: 2            Anesthesia Type: general    Vitals Value Taken Time   /66 08/14/24 1756   Temp 97.7 °F (36.5 °C) 08/14/24 1756   Pulse 93 08/14/24 1757   Resp 14 08/14/24 1757   SpO2 100 % 08/14/24 1757   Vitals shown include unfiled device data.    Patient Location: ICU    Anesthesia Type: general    Airway Patency: patent and intubated    Postop Pain Control: adequate    Mental Status: Other: (Intubated and sedated)    Nausea/Vomiting: none    Cardiopulmonary/Hydration status: stable euvolemic    Complications: anesthesia related complications, see comments (Required reintubation and ICU admission)    Postop vital signs: stable    Comments: Pt transferred to ICU with full monitors and ambubag. Report given to ICU RN and APN.     After meeting all extubation criteria, pt with airway obstruction following extubation. Unable to ventilate without jaw thrust despite being fully awake, strong, and following commands. Decision ultimately made to reintubate for airway protection until further evaluation can be performed. VSS upon transfer to ICU.     Dental Exam: Unchanged from Preop    Sign out given to ICU staff.

## 2024-08-14 NOTE — OPERATIVE REPORT
ROBOTIC-ASSISTED LAPAROSCOPIC  SIMPLE PROSTATECTOMY OPERATIVE NOTE    PATIENT NAME: Troy Vidal  YOB: 1955  DATE OF SERVICE: 8/14/2024    PREOPERATIVE DIAGNOSIS:  BPH with LUTS, Urinary Retention    POSTOPERATIVE DIAGNOSIS:  Same    PROCEDURES PERFORMED:  Robotic-assisted laparoscopic simple prostatectomy    SURGEON:  Ben Ramires MD    ASSISTANTS:  Florentin Villasenor    ANESTHESIA:  General Endotracheal    ANTIBIOTIC PROPHYLAXIS:  2g Ancef    SPECIMENS:  Prostate adenoma    ESTIMATED BLOOD LOSS:  100mL    DRAINS:  16F Rosales catheter (15mL in balloon)  15F Jordan drain    COMPLICATIONS:  No immediate complications    INDICATIONS FOR PROCEDURE:  69 year old gentleman with a known history of BPH with LUTS who recently developed urinary retention. He has a ~190mL gland on prior prostate MRI from late 2023. We had previously met and discussed treatment options, including a TURP, HoLEP, or robotic simple prostatectomy. He elected to proceed with a robotic-assisted laparoscopic simple prostatectomy. Please see my pre-operative and office notes for procedural counseling, including the risks of the procedure.     DESCRIPTION OF PROCEDURE:  After reviewing the indications for the procedure, informed consent was reviewed and signed by the patient.    The patient was brought to the operating room and placed in the supine position on the OR table.  SCD's were applied and all pressure points were carefully padded.  At this point, general endotracheal anesthesia was successfully induced.  The patient was then given perioperative antibiotics and subcutaneous enoxaparin prior to initiation of the procedure.  At this point, he remained in the supine position and his abdomen was prepped with chlorhexidine and his genitals and upper thighs were prepped with betadine solution.  He was draped in the usual sterile fashion.  A 16F rosales catheter was placed using a sterile technique and the balloon was inflated with 10cc of  sterile water. The bladder was drained fully.  We placed the patient in a steep Trendelenburg position to ensure there was no motion of the patient on the bed. At this point, a procedural timeout was performed where the patient and procedure were correctly identified using the patient's full name and date of birth.    A Veress needle was inserted above the umbilicus into the peritoneal cavity.  A drop test was performed to ensure there was no return of blood or enteric contents.  At this point insufflation was initiated and there were acceptably low opening pressures.  The abdomen was then insufflated to 15mmHg.  At this point a transverse 8mm incision was made in the supraumbilical region through which a non-bladed 8mm trocar was inserted into the peritoneal cavity.  The camera was then passed through this port and the peritoneum was inspected. There was no evidence of intraabdominal pathology.  There were a few sigmoid colon adhesions noted to the pelvic sidewall.      At this point, the remainder of the working ports were placed under direct vision.  Two 8mm ports were placed in the left lower quadrant, an 8mm port was placed in the right lower quadrant, and a 12mm assistant port was placed in the far right lower quadrant.  There was no evidence of injury to the intraperitoneal contents during port placement.  The patient was again placed in steep Trendelenburg position and the robot was then docked.    All instruments were then placed under direct vision.  I began by taking down the attachments of the sigmoid colon to the left pelvic sidewall in order to identify the external iliac vessels and ensure mobility of the sigmoid colon and rectum.     I then filled the bladder with 180 mL of sterile saline, through the previously placed rosales catheter.  I made a vertical cystotomy and entered the bladder.  Soy needles on a 0 silk suture were placed in the bilateral lower quadrants and used to retract the lateral  aspects of the bladder to create space within the bladder lumen. I identified his enlarged prostate gland, with a moderate sized intravesical median lobe.     I then scored the bladder mucosa at the junction of the bladder and prostate.  I dissected down onto the prostate adenoma.  I then used a combination of blunt and sharp dissection as well as spot cautery to circumferentially dissect the prostate adenoma from the prostatic capsule.  Hemostasis was fairly good throughout.  I came around the apex of the adenoma and identified the rosales catheter in the midline.  Care was taken to sharply release the adenoma at the apex, to prevent any injury to the external sphincter. Once the adenoma was freed, it was placed into an endo catch bag for later retrieval.     I then used a 3-0 v lock suture at 5 and 7 o'clock to oversew any pedicle vessels.  I placed Surgiflo in the prostatic fossa.  I then performed a bladder neck advancement, by suturing the posterior bladder neck to the posterior urethra.  I then closed the bladder neck using the same v-lock suture. A 20F 3 way rosales was placed and the balloon was inflated with 20mL of sterile water.      I then closed the vertical cystotomy in 2 layers, first using a 3-0 v lock suture, and then a 2-0 v lock suture in a running fashion. The bladder was filled with 150mL of sterile water and appeared water tight.     I placed a 15F sal drain through the robotic 4th arm incision and this was placed into the pelvis. It was secured to his skin using a nylon suture.     We then closed the 12mm assistant port using a 0 PDS and an endo close device. We then extracted the endo catch bag by extending the supraumbilical 8mm incision.  Fascia was closed using a 0 PDS suture in a figure-of-eight fashion.     Skin was closed using 4-0 Monocryl sutures in a subcuticular fashion.  Surgical glue was placed over each incision site.      The patient was then awoken from anesthesia and transferred  to the recovery room in stable condition. His catheter was irrigated and was wine-colored with some clots, so I elected to start a moderate CBI drip post op.  Urine was a pink to cherry color on a slow to moderate CBI drip.     He tolerated the procedure well without any complications.    At the completion of the procedure, our sponge, instrument, and needle count was correct x2.    Florentin Villasenor served as my bedside assistant for the entirety of the procedure.  I was present for the entirety of the procedure and completed all major portions myself.    PLAN:  He will be admitted to the surgical floor post-operatively for observation for 1-2 nights. His rosales catheter will remain in place until post-operative follow-up.  Continue CBI overnight. Hand irrigate gently as needed.       Ben Ramires MD  [facility]  Department of Urology  Office: [phone]

## 2024-08-14 NOTE — ANESTHESIA PROCEDURE NOTES
Regional Block    Date/Time: 8/14/2024 4:12 PM    Performed by: Lazara Manzo CRNA  Authorized by: Jonatan Kenyon MD      General Information and Staff    Start Time:  8/14/2024 4:12 PM  End Time:  8/14/2024 4:20 PM  Anesthesiologist:  Jonatan Kenyon MD  CRNA:  Lazara Manzo CRNA  Performed by:  CRNA  Patient Location:  OR    Block Placement: Post Induction  Site Identification: real time ultrasound guided and image stored and retrievable    Block site/laterality marked before start: site marked  Reason for Block: at surgeon's request and post-op pain management    Preanesthetic Checklist: 2 patient identifers, IV checked, risks and benefits discussed, monitors and equipment checked, pre-op evaluation, timeout performed, anesthesia consent, sterile technique used, no prohibitive neurological deficits and no local skin infection at insertion site      Procedure Details    Patient Position:  Supine  Prep: ChloraPrep    Monitoring:  Cardiac monitor, continuous pulse ox and blood pressure cuff  Block Type:  TAP  Laterality:  Bilateral  Injection Technique:  Single-shot    Needle    Needle Type:  Short-bevel and echogenic  Needle Gauge:  21 G  Needle Length:  110 mm  Needle Localization:  Ultrasound guidance  Reason for Ultrasound Use: appropriate spread of the medication was noted in real time and no ultrasound evidence of intravascular and/or intraneural injection            Assessment    Injection Assessment:  Good spread noted, negative resistance, negative aspiration for heme, incremental injection and low pressure  Heart Rate Change: No    - Patient tolerated block procedure well without evidence of immediate block related complications.     Medications  8/14/2024 4:12 PM      Additional Comments    Medication:  Bupivacaine 0.25% 30mL  with 1:200,000 epi to each side

## 2024-08-14 NOTE — ANESTHESIA PROCEDURE NOTES
Airway  Date/Time: 8/14/2024 5:24 PM  Urgency: elective    Airway not difficult    General Information and Staff    Patient location during procedure: OR  Anesthesiologist: Jonatan Kenyon MD  Resident/CRNA: Lazara Manzo CRNA  Performed: anesthesiologist   Performed by: Lazara Manzo CRNA  Authorized by: Jonatan Kenyon MD      Indications and Patient Condition  Indications for airway management: anesthesia  Spontaneous Ventilation: absent  Sedation level: deep  Preoxygenated: yes  Patient position: sniffing  Mask difficulty assessment: 1 - vent by mask    Final Airway Details  Final airway type: endotracheal airway      Successful airway: ETT  Cuffed: yes   Successful intubation technique: Video laryngoscopy  Facilitating devices/methods: intubating stylet and anterior pressure/BURP  Endotracheal tube insertion site: oral  Blade: GlideScope  Blade size: #4  ETT size (mm): 7.5    Cormack-Lehane Classification: grade IIA - partial view of glottis  Placement verified by: capnometry   Measured from: lips  ETT to lips (cm): 22  Number of attempts at approach: 1    Additional Comments  Potential mass visualized with glidescope. Late entry - No notable airway edema or airway trauma noted upon reintubation which would explain failed extubation.

## 2024-08-14 NOTE — ANESTHESIA PROCEDURE NOTES
Airway  Date/Time: 8/14/2024 12:55 PM  Urgency: elective    Airway not difficult    General Information and Staff    Patient location during procedure: OR  Anesthesiologist: Jonatan Kenyon MD  Resident/CRNA: Lazara Manzo CRNA  Performed: CRNA   Performed by: Lazara Manzo CRNA  Authorized by: Jonatan Kenyon MD      Indications and Patient Condition  Indications for airway management: anesthesia  Spontaneous Ventilation: absent  Sedation level: deep  Preoxygenated: yes  Patient position: sniffing  Mask difficulty assessment: 1 - vent by mask    Final Airway Details  Final airway type: endotracheal airway      Successful airway: ETT  Cuffed: yes   Successful intubation technique: Video laryngoscopy  Endotracheal tube insertion site: oral  Blade: GlideScope  Blade size: #4  ETT size (mm): 7.5    Cormack-Lehane Classification: grade I - full view of glottis  Placement verified by: capnometry   Measured from: lips  ETT to lips (cm): 22  Number of attempts at approach: 1  Ventilation between attempts: BVM  Number of other approaches attempted: 1    Other Attempts  Unsuccessful attempted airways: endotracheal tube  Unsuccessful attempted endotracheal techniques: direct laryngoscopy    Additional Comments  Poor view with MAC4 DL. Significant redundant tissue noted. Glidescope readily available in room - atraumatic intubation.

## 2024-08-14 NOTE — CONSULTS
ICU  Critical Care APRN Progress Note    NAME: Troy Vidal - ROOM: 452/2-A - MRN: [id number] - Age: 69 year old - :1955    History Of Present Illness:  Troy Vidal is a 69 year old male with PMHx significant for BPH and high cholesterol presents to the hospital for scheduled TURP.  Post extubation, patient developed difficulty breathing, oral and nasal airway with jaw thrust performed without significant improvement.  Patient reintubated and admitted to ICU for airway and hemodynamic monitoring.    Patient to ICU on bed.  Patient intubated and sedated.    PMH:  Past Medical History:    BPH (benign prostatic hyperplasia)    Elevated PSA    Hearing impaired person, bilateral    Bilateral hearing aids    High cholesterol    Visual impairment    Occasionally wears glasses       Social Hx:  Social History     Socioeconomic History    Marital status:    Tobacco Use    Smoking status: Never    Smokeless tobacco: Never   Vaping Use    Vaping status: Never Used   Substance and Sexual Activity    Alcohol use: Yes     Comment: social    Drug use: No     Social Determinants of Health     Food Insecurity: Low Risk  (8/15/2022)    Received from [facility]    Food Insecurity     Have there been times that your food ran out, and you didn't have money to get more?: No     Are there times that you worry that this might happen?: No   Transportation Needs: Low Risk  (8/15/2022)    Received from [facility]    Transportation Needs     Do you have trouble getting transportation to medical appointments?: No   Housing Stability: Low Risk  (8/15/2022)    Received from [facility]    Housing Stability     Are you concerned about having a safe and reliable place to live?: No       Family Hx:  Family History   Problem Relation Age of Onset    Heart Disorder Father     Cancer Mother          Review of Systems:   A comprehensive 10 point review of systems was  completed.  Pertinent positives and negatives noted in the HPI.    OBJECTIVE  Vitals:  /66   Pulse 90   Temp 97.3 °F (36.3 °C) (Temporal)   Resp 15   Ht 170.2 cm (5' 7\")   Wt 209 lb 7 oz (95 kg)   SpO2 100%   BMI 32.80 kg/m²              Ventilator Settings: VC+ 16// FIO2 100/PEEP 5.    Physical Exam:    General Appearance: sedated,no distress, appears stated age  Neck: No JVD, neck supple, no adenopathy, trachea midline  Lungs: Clear to auscultation bilaterally, respirations unlabored  Heart: Regular rate and rhythm, S1 and S2 normal, no murmur, rub or gallop  Abdomen: Soft, non-tender, bowel sounds active all four quadrants, no masses, no organomegaly  Extremities: Extremities normal, atraumatic, no cyanosis or edema,capillary refill <3 sec.    Pulses: 2+ and symmetric all extremities  Skin: Skin color, texture, turgor normal for ethnicity, no rashes or lesions, warm and dry  Neurologic: CNII-XII intact, normal strength    Data this admission:  No results found.    Labs:       Assessment/Plan:    Airway obstruction s/p surgery, possible mass on uvula  -Check cuff leak prior to extubation  -Consult ENT for possible mass  -Steroids per ENT  S/P TURP  -post-op orders per urology  -CBI  BPH  -continue flomax  F/E/N  -NPO  -IVF  -replete electrolytes as needed  Proph  -SCD  -heparin when ok per urology  Dispo  -full code  -ICU for risk of deterioration      Plan of care discussed with intensivist on-call, Dr. Marte.    Lottie Jiang, [facility]  Critical Care NP  Phone [phone]      A total of 45 minutes of critical care time (exclusive of billable procedures) was administered. This involved direct patient intervention, complex decision making, and/or extensive discussions with the patient, family, and clinical staff.

## 2024-08-14 NOTE — ANESTHESIA PROCEDURE NOTES
Peripheral IV  Date/Time: 8/14/2024 1:00 PM  Inserted by: Lazara Manzo CRNA    Placement  Needle size: 18 G  Laterality: right  Location: hand  Local anesthetic: none  Site prep: alcohol  Technique: anatomical landmarks  Attempts: 1

## 2024-08-15 ENCOUNTER — APPOINTMENT (OUTPATIENT)
Dept: CT IMAGING | Facility: HOSPITAL | Age: 69
End: 2024-08-15
Attending: INTERNAL MEDICINE
Payer: MEDICARE

## 2024-08-15 ENCOUNTER — APPOINTMENT (OUTPATIENT)
Dept: GENERAL RADIOLOGY | Facility: HOSPITAL | Age: 69
End: 2024-08-15
Attending: INTERNAL MEDICINE
Payer: MEDICARE

## 2024-08-15 LAB
ANION GAP SERPL CALC-SCNC: 8 MMOL/L (ref 0–18)
BUN BLD-MCNC: 13 MG/DL (ref 9–23)
CALCIUM BLD-MCNC: 8.7 MG/DL (ref 8.7–10.4)
CHLORIDE SERPL-SCNC: 107 MMOL/L (ref 98–112)
CO2 SERPL-SCNC: 23 MMOL/L (ref 21–32)
CREAT BLD-MCNC: 0.9 MG/DL
EGFRCR SERPLBLD CKD-EPI 2021: 94 ML/MIN/1.73M2 (ref 60–?)
ERYTHROCYTE [DISTWIDTH] IN BLOOD BY AUTOMATED COUNT: 12.3 %
GLUCOSE BLD-MCNC: 142 MG/DL (ref 70–99)
HCT VFR BLD AUTO: 37.8 %
HGB BLD-MCNC: 13 G/DL
MAGNESIUM SERPL-MCNC: 1.6 MG/DL (ref 1.6–2.6)
MCH RBC QN AUTO: 31.3 PG (ref 26–34)
MCHC RBC AUTO-ENTMCNC: 34.4 G/DL (ref 31–37)
MCV RBC AUTO: 91.1 FL
OSMOLALITY SERPL CALC.SUM OF ELEC: 289 MOSM/KG (ref 275–295)
PLATELET # BLD AUTO: 158 10(3)UL (ref 150–450)
POTASSIUM SERPL-SCNC: 4 MMOL/L (ref 3.5–5.1)
RBC # BLD AUTO: 4.15 X10(6)UL
SODIUM SERPL-SCNC: 138 MMOL/L (ref 136–145)
WBC # BLD AUTO: 13.9 X10(3) UL (ref 4–11)

## 2024-08-15 PROCEDURE — 71045 X-RAY EXAM CHEST 1 VIEW: CPT | Performed by: INTERNAL MEDICINE

## 2024-08-15 PROCEDURE — 70491 CT SOFT TISSUE NECK W/DYE: CPT | Performed by: INTERNAL MEDICINE

## 2024-08-15 PROCEDURE — 99223 1ST HOSP IP/OBS HIGH 75: CPT | Performed by: OTOLARYNGOLOGY

## 2024-08-15 PROCEDURE — 99291 CRITICAL CARE FIRST HOUR: CPT | Performed by: INTERNAL MEDICINE

## 2024-08-15 RX ORDER — MAGNESIUM SULFATE HEPTAHYDRATE 40 MG/ML
2 INJECTION, SOLUTION INTRAVENOUS ONCE
Status: COMPLETED | OUTPATIENT
Start: 2024-08-15 | End: 2024-08-15

## 2024-08-15 RX ORDER — METHYLPREDNISOLONE SODIUM SUCCINATE 125 MG/2ML
INJECTION, POWDER, LYOPHILIZED, FOR SOLUTION INTRAMUSCULAR; INTRAVENOUS
Status: DISCONTINUED
Start: 2024-08-15 | End: 2024-08-15 | Stop reason: WASHOUT

## 2024-08-15 NOTE — PHYSICAL THERAPY NOTE
PHYSICAL THERAPY EVALUATION - INPATIENT     Room Number: 452/452-A  Evaluation Date: 8/15/2024  Type of Evaluation: Initial  Physician Order: PT Eval and Treat    Presenting Problem: s/p robotic assisted laparoscpic simple prostatectomy 8/14/24  Co-Morbidities : BPH, HLD  Reason for Therapy: Mobility Dysfunction and Discharge Planning    PHYSICAL THERAPY ASSESSMENT   Patient is currently functioning near baseline with bed mobility, transfers, and gait.  Prior to admission, patient's baseline is independent.  Patient is requiring supervision as a result of the following impairments: impaired standing balance, decreased muscular endurance, and medical status. Physical Therapy will continue to follow for duration of hospitalization.      Patient will benefit from continued skilled PT Services for duration of hospitalization, however, given the patient is functioning near baseline level do not anticipate skilled therapy needs at discharge .    PLAN  PT Treatment Plan: Bed mobility;Endurance;Energy conservation;Balance training;Transfer training;Stair training;Strengthening;Gait training  Rehab Potential : Good  Frequency (Obs): 5x/week  Number of Visits to Meet Established Goals: 4      CURRENT GOALS    Goal #1 Patient is able to demonstrate supine - sit EOB @ level: supervision     Goal #2 Patient is able to demonstrate transfers EOB to/from BSC at assistance level: supervision     Goal #3 Patient is able to ambulate 150 feet with assist device: LRAD at assistance level: supervision     Goal #4 Pt will ascend/descend 1 flight of stairs with supervision   Goal #5    Goal #6    Goal Comments: Goals established on 8/15/2024      PHYSICAL THERAPY MEDICAL/SOCIAL HISTORY  History related to current admission: Patient is a 69 year old male admitted on 8/14/2024 from home for robotic assisted laparoscopic simple prostatectomy.  Pt had difficulty breathing upon extubation post op and re intubated by anesthesia 8/14/24. Pt  extubated 8/15/24.     HOME SITUATION  Type of Home: House   Home Layout: Two level  Stairs to Enter : 2  Railing: Yes  Stairs to Bedroom: 16  Railing: Yes    Lives With: Spouse  Drives: Yes          Prior Level of [place]: Pt lives with spouse in 2 story home. Pt independent with ADL and mobility. Pt does not use RW or cane. Pt is active and enjoys lifting weights and cycling.     SUBJECTIVE  \"It feels good to sit in the chair.\"       OBJECTIVE  Precautions: None  Fall Risk: Standard fall risk    WEIGHT BEARING RESTRICTION  Weight Bearing Restriction: None                PAIN ASSESSMENT  Ratin          COGNITION  Overall Cognitive Status:  WFL - within functional limits    RANGE OF MOTION AND STRENGTH ASSESSMENT  Upper extremity ROM and strength are within functional limits     Lower extremity ROM is within functional limits     Lower extremity strength is within functional limits       BALANCE  Static Sitting: Good  Dynamic Sitting: Good  Static Standing: Fair  Dynamic Standing: Fair    ADDITIONAL TESTS                                    ACTIVITY TOLERANCE                         O2 WALK       NEUROLOGICAL FINDINGS                        AM-PAC '6-Clicks' INPATIENT SHORT FORM - BASIC MOBILITY  How much difficulty does the patient currently have...  Patient Difficulty: Turning over in bed (including adjusting bedclothes, sheets and blankets)?: A Little   Patient Difficulty: Sitting down on and standing up from a chair with arms (e.g., wheelchair, bedside commode, etc.): A Little   Patient Difficulty: Moving from lying on back to sitting on the side of the bed?: A Little   How much help from another person does the patient currently need...   Help from Another: Moving to and from a bed to a chair (including a wheelchair)?: A Little   Help from Another: Need to walk in hospital room?: A Little   Help from Another: Climbing 3-5 steps with a railing?: A Little       AM-PAC Score:  Raw Score: 18   Approx Degree  of Impairment: 46.58%   Standardized Score (AM-PAC Scale): 43.63   CMS Modifier (G-Code): CK    FUNCTIONAL ABILITY STATUS  Gait Assessment   Functional Mobility/Gait Assessment  Gait Assistance: Supervision  Distance (ft): 5  Assistive Device: None    Skilled Therapy Provided   Supervision for bed mobility.   VC for pacing and safety.   Pt impulsive with standing.   Sit-stand with supervision.   Pt transferred to chair safely with supervision.   Tolerated well.   Returned to sitting up in bedside chair.     Bed Mobility:  Rolling: supervision  Supine to sit: supervision   Sit to supine: NT     Transfer Mobility:  Sit to stand: supervision   Stand to sit: supervision  Gait = supervision    Therapist's Comments:  Vitals stable. RN okd patient up to chair. Pt tolerated well.     Exercise/Education Provided:  Bed mobility  Energy conservation  Functional activity tolerated  Gait training  Posture  Strengthening  Transfer training    Patient End of Session: Up in chair;Needs met;Call light within reach;RN aware of session/findings;All patient questions and concerns addressed;Family present      Patient Evaluation Complexity Level:  History Moderate - 1 or 2 personal factors and/or co-morbidities   Examination of body systems Low -  addressing 1-2 elements   Clinical Presentation Low- Stable   Clinical Decision Making Low Complexity       PT Session Time: 20 minutes  Gait Training:  minutes  Therapeutic Activity:  minutes  Neuromuscular Re-education:  minutes  Therapeutic Exercise:  minutes

## 2024-08-15 NOTE — CONSULTS
General Medicine Consult      Reason for consult:    Consulted by: Ben Ramires MD    PCP: ALPHONSO OJEDA      History of Present Illness: Patient is a 69 year old male with past medical history of hyperlipidemia, BPH is being seen for perioperative medical management after undergoing a scheduled TURP by Dr. Aldana yesterday evening.  Postop course complicated by airway compromise and difficulty breathing which required reintubation and transferred to ICU for overnight monitoring.  This morning he was evaluated by ENT, CT with no notable soft tissue neck mass and he was subsequently extubated and is so far tolerating without difficulty.  He is hoping he can eat soon otherwise denies any other problems.  His CBI is with watermelon colored urine.      PMH:  Past Medical History:    BPH (benign prostatic hyperplasia)    Elevated PSA    Hearing impaired person, bilateral    Bilateral hearing aids    High cholesterol    Visual impairment    Occasionally wears glasses        PSH:  Past Surgical History:   Procedure Laterality Date    Colonoscopy N/A 3/1/2019    Procedure: COLONOSCOPY, POSSIBLE BIOPSY, POSSIBLE POLYPECTOMY 28217;  Surgeon: Sha Lord MD;  [place] [facility] [place] [facility]    Other surgical history  3/2/10    prostate bx-Dr. Love    Skin surgery  2/8/12    MMS to Left malar cheek for BCC 2/8/12        Home Medications:  Outpatient Medications Marked as Taking for the 8/14/24 encounter (Hospital Encounter)   Medication Sig Dispense Refill    tamsulosin 0.4 MG Oral Cap Take 1 capsule (0.4 mg total) by mouth daily.      dutasteride 0.5 MG Oral Cap Take 1 capsule (0.5 mg total) by mouth nightly.         Scheduled Medication:   tamsulosin  0.4 mg Oral Daily    enoxaparin  40 mg Subcutaneous Daily    sennosides  17.2 mg Oral Nightly    docusate sodium  100 mg Oral BID     Continuous Infusing Medication:   lactated ringers 100 mL/hr at 08/15/24 0647    sodium chloride       PRN Medication:   meperidine    polyethylene glycol (PEG 3350)    ondansetron    metoclopramide    oxyCODONE **OR** oxyCODONE    HYDROmorphone **OR** HYDROmorphone    melatonin    bacitracin    oxybutynin    benzocaine-menthol    acetaminophen **OR** acetaminophen **OR** acetaminophen **OR** acetaminophen    senna    bisacodyl    fleet enema     ALL:  No Known Allergies     Soc Hx:  Social History     Tobacco Use    Smoking status: Never    Smokeless tobacco: Never   Substance Use Topics    Alcohol use: Yes     Comment: social        Fam Hx  Family History   Problem Relation Age of Onset    Heart Disorder Father     Cancer Mother        Review of Systems  Comprehensive ROS reviewed and negative except for what's stated above.  Including negative for chest pain, shortness of breath, syncope.       OBJECTIVE:  /70   Pulse 79   Temp 98 °F (36.7 °C) (Temporal)   Resp 18   Ht 5' 7\" (1.702 m)   Wt 209 lb 7 oz (95 kg)   SpO2 98%   BMI 32.80 kg/m²   General:  Alert, no distress, appears stated age.   Head:  Normocephalic, without obvious abnormality, atraumatic.   Eyes:  Sclera anicteric, No conjunctival pallor, EOMs intact.    Nose: Nares normal. Septum midline. Mucosa normal. No drainage.   Throat: Lips, mucosa, and tongue normal. Teeth and gums normal.   Neck: Supple, symmetrical, trachea midline, no cervical or supraclavicular lymph adenopathy, thyroid: no enlargment/tenderness/nodules appreciated   Lungs:   Clear to auscultation bilaterally. Normal effort   Chest wall:  No tenderness or deformity.   Heart:  Regular rate and rhythm, S1, S2 normal, no murmur, rub or gallop appreciated   Abdomen:   Soft, non-tender. Bowel sounds normal. No masses,  No organomegaly. Non distended   Extremities: Extremities normal, atraumatic, no cyanosis or edema.   Skin: Skin color, texture, turgor normal. No rashes or lesions.    Neurologic: Normal strength, no focal deficit appreciated       Diagnostics:   CBC/Chem  Recent Labs   Lab  08/14/24  1756 08/15/24  0453   WBC 14.4* 13.9*   HGB 14.1 13.0   MCV 94.4 91.1   .0* 158.0       Recent Labs   Lab 08/14/24  1756 08/15/24  0453     137 138   K 3.6  3.6 4.0     106 107   CO2 21.0  21.0 23.0   BUN 9  9 13   CREATSERUM 1.00  1.00 0.90   *  202* 142*   CA 8.5*  8.5* 8.7   MG  --  1.6       Recent Labs   Lab 08/14/24 1756   ALT 20   AST 24   ALB 3.4       Radiology:   All imaging personally reviewed      CT SOFT TISSUE OF NECK(CONTRAST ONLY) (CPT=70491)    Result Date: 8/15/2024  PROCEDURE:  CT SOFT TISSUE OF NECK(CONTRAST ONLY) (CPT=70491)  COMPARISON:  None.  INDICATIONS:  possible upper airway lesion. failed extubation  TECHNIQUE:  IV contrast-enhanced multislice CT scanning is performed through the neck soft tissues during administration of nonionic contrast.  Dose reduction techniques were used. Dose information is transmitted to the ACR (American College of Radiology) NRDR (National Radiology Data Registry) which includes the Dose Index Registry. Examination was performed and interpreted on emergency basis, as per requested STAT status.   PATIENT STATED HISTORY:(As transcribed by Technologist)  Patient is here with recent surgery. Upon extubation, Patient had difficulties with breathing   CONTRAST USED:  50cc of Isovue 370  FINDINGS:   The patient is intubated, with the endotracheal tube tip in the approximate mid trachea.  Upper aspect of the lung shows no sign of pneumothorax.  No sign of pneumomediastinum.  Adenoid soft tissue hypertrophy present.  There is also soft tissue fullness asymmetric left greater than right tonsils.  Parapharyngeal spaces show no infiltration.  No abscess identified.  No gas collection.  No fluid collection in the neck.  Scattered normal size lymph nodes in the neck.  Major salivary glands show no abnormalities.  Normal size thyroid gland.            CONCLUSION:  Endotracheal tube tip in the approximate mid trachea.  Prominent  adenoid soft tissues and left greater than right tonsillar tissues.  May reflect inflammatory changes.  After the patient has been extubated, consider direct visualization to exclude masses.  No abscess, gas collection, pneumothorax, pneumomediastinum, cyst or other fluid collection.   LOCATION:  [facility]    Dictated by (CST): Alex Severino MD on 8/15/2024 at 9:44 AM     Finalized by (CST): Alex Severino MD on 8/15/2024 at 9:48 AM       XR CHEST AP PORTABLE  (CPT=71045)    Result Date: 8/15/2024  PROCEDURE:  XR CHEST AP PORTABLE  (CPT=71045)  TECHNIQUE:  AP chest radiograph was obtained.  COMPARISON:  [facility] , XR, XR CHEST AP PORTABLE  (CPT=71045), 8/14/2024, 7:01 PM.  INDICATIONS:  Respiratory failure.  Follow-up.  PATIENT STATED HISTORY: (As transcribed by Technologist)  Patient offered no additional information at this time.    FINDINGS:  Lung volumes are satisfactory.  Minimal to mild reticular opacities at the left base appear unchanged and may be any combination of scar, atelectasis or pneumonitis.  There is a new curvilinear band of opacity in right mid to lower lung consistent with subsegmental atelectasis.  CP angles remain sharp.  No pneumothorax.  Heart and pulmonary vessels appear stable, normal caliber allowing for portable technique.  Smooth mediastinal contours.  Endotracheal tube, tip approximately 4 cm from  the feliciano.             CONCLUSION:  New band of subsegmental atelectasis in the right mid to lower lung.  Other findings appear stable.   LOCATION:  [facility]      Dictated by (CST): Ramiro Marx MD on 8/15/2024 at 8:27 AM     Finalized by (CST): Ramiro Marx MD on 8/15/2024 at 8:29 AM       XR CHEST AP PORTABLE  (CPT=71045)    Result Date: 8/14/2024  PROCEDURE:  XR CHEST AP PORTABLE  (CPT=71045)  TECHNIQUE:  AP chest radiograph was obtained.  COMPARISON:  None.  INDICATIONS:  Post intubation  PATIENT STATED HISTORY: (As transcribed by Technologist)  Patient offered no additional history at this  time.     FINDINGS:  Endotracheal tube terminates approximately 4.7 centimeters above the feliciano.  Mild left basilar airspace linear opacity, which may be related to atelectasis though superimposed infection/inflammation and/or aspiration is not excluded, correlate clinically.  No pleural effusion.  No pneumothorax.  No pulmonary edema.  The cardiomediastinal silhouette is within normal limits.  No acute osseous findings.            CONCLUSION:  See above   LOCATION:  [facility]      Dictated by (CST): Leonard Hughes MD on 8/14/2024 at 7:08 PM     Finalized by (CST): Leonard Hughes MD on 8/14/2024 at 7:10 PM            ASSESSMENT / PLAN:    69 year old male with past medical history of hyperlipidemia, BPH is being seen for perioperative medical management after undergoing a scheduled TURP     S/p TURP, [place] 8/14   - PO/IV meds for pain control per surgery, wean to oral meds as able  - Adv diet as tolerated per surgery, zofran prn for nausea, OBR  - PT/OT/SW  - SCD for DVT prophy  - rosales management per urology    Acute hypoxic resp failure 2/2 airway obstruction s/p surgery  - s/p reintubation 8/14 and extubation 8/15 AM  - taper O2 as tolerated  - CT imaging reviewed  - ENT/ICU following    BPH  - flomax      Outpatient records reviewed confirming patient's medical history and medications.     Further recommendations pending patient's clinical course.  DM hospitalist to continue to follow patient while in house    Patient and/or patient's family given opportunity to ask questions and note understanding and agreeing with therapeutic plan as outlined      Thank you for allowing me to participate in the care of this patient.     Thank You,      Nu Vu MD  [facility]  Hospitalist  Message over [employer]  Pager: [phone]

## 2024-08-15 NOTE — PROGRESS NOTES
08/15/24 0401   Vent Information   Vent Initiation Date 08/14/24   Ventilation Day(s) 2   Interface Invasive   Vent Type PB   Vent plugged into main power? Yes   Vent -10   Vent Mode VC+   Settings   FiO2 (%) 40 %   Resp Rate (Set) 14   Vt (Set, mL) 600 mL   Waveform Decelerating ramp   PEEP/CPAP (cm H2O) 5 cm H20   Insp Time (sec) 1 sec   Insp Rise Time (%) 50 %   Trigger Sensitivity Flow (L/min) 3 L/min   Humidification Heater   H2O Bag Level 3/4 Full   Heater Temperature 98.6 °F (37 °C)   Readings   Total RR 14   Minute Ventilation (L/min) 8.6 L/min   Inspiratory Tidal Volume 600 mL   Expiratory Tidal Volume 613 mL   PIP Observed (cm H2O) 22 cm H2O   MAP (cm H2O) 9.6   I/E Ratio 1:3.3   Plateau Pressure (cm H2O) 20 cm H2O   Alarms   High RR 40   Insp Pressure High (cm H2O) 40 cm H2O   MV High (L/min) 20 L/min   MV Low (L/min) 3 L/min   Apnea Interval (sec) 20 seconds   Apnea Rate 14   Apnea Volume (mL) 600 mL   ETT   Placement Date/Time: 08/14/24 1749   Placed by External Staff?: (c)   Airway Size: 7.5 mm  Cuffed: Cuffed   Secured at (cm) 24 cm   Measured From Lips   Secured Location Center   Secured by Commercial tube ibrahim   Site Condition Dry   Req'd equipment at bedside Bag mask     Assessment and Objective  INTERVENTIONS:  - Assess for changes in respiratory status  - Assess for changes in mentation and behavior  - Position to facilitate oxygenation and minimize respiratory effort  - Oxygen supplementation based on oxygen saturation or ABGs  - Provide Smoking Cessation handout, if applicable  - Encourage broncho-pulmonary hygiene including cough, deep breathe, Incentive Spirometry  - Assess the need for suctioning and perform as needed  - Assess and instruct to report SOB or any respiratory difficulty  - Respiratory Therapy support as indicated  - Manage/alleviate anxiety  - Monitor for signs/symptoms of CO2 retention    Most Recent ABG (if any available):    Recent Labs   Lab 08/14/24 1918    ABGPHT 7.40   PKKREI7D 37   RUNGY2F 346*   ABGHCO3 23.8   ABGBE -1.5   TEMP 98.6   HIGINIO Positive   SITE Left Radial   DEV  adult   THGB 13.3     Pt received on the above vent settings. No changes made overnight.

## 2024-08-15 NOTE — ADDENDUM NOTE
Addendum  created 08/15/24 0756 by Lazara Manzo CRNA    Clinical Note Signed, Intraprocedure Blocks edited, SmartForm saved

## 2024-08-15 NOTE — CONSULTS
[facility]   part of [facility]    Report of Consultation    Troy ANGEL Young Patient Status:  Inpatient    1955 MRN [id number]   Location [facility] 4SW-A Attending Ben Ramires MD   Hosp Day # 1 PCP TROY OJEDA     Date of Admission:  2024  Date of Consult:  [unfilled]  Reason for Consultation:   Difficult extubation    History of Present Illness:   Patient is a 69 year old male who was admitted to the hospital for <principal problem not specified>:  Patient was extubated yesterday and had to be reintubated.  Wife interviewed and she denies patient has had any airway issues.  Post t/a younger.    Past Medical History  Past Medical History:    BPH (benign prostatic hyperplasia)    Elevated PSA    Hearing impaired person, bilateral    Bilateral hearing aids    High cholesterol    Visual impairment    Occasionally wears glasses       Past Surgical History  Past Surgical History:   Procedure Laterality Date    Colonoscopy N/A 3/1/2019    Procedure: COLONOSCOPY, POSSIBLE BIOPSY, POSSIBLE POLYPECTOMY 66269;  Surgeon: Sha Lord MD;  Location: [facility] SURGICAL [facility]    Other surgical history  3/2/10    prostate bx-Dr. Love    Skin surgery  12    MMS to Left malar cheek for BCC 14       Family History  Family History   Problem Relation Age of Onset    Heart Disorder Father     Cancer Mother        Social History  Pediatric History   Patient Parents    Not on file     Other Topics Concern    Not on file   Social History Narrative    Not on file           Current Medications:  Current Facility-Administered Medications   Medication Dose Route Frequency    lactated ringers infusion   Intravenous Continuous    meperidine (Demerol) 25 MG/ML injection 12.5 mg  12.5 mg Intravenous PRN    tamsulosin (Flomax) cap 0.4 mg  0.4 mg Oral Daily    enoxaparin (Lovenox) 40 MG/0.4ML SUBQ injection 40 mg  40 mg Subcutaneous Daily    sennosides (Senokot) tab 17.2 mg  17.2 mg Oral Nightly     docusate sodium (Colace) cap 100 mg  100 mg Oral BID    polyethylene glycol (PEG 3350) (Miralax) 17 g oral packet 17 g  17 g Oral Daily PRN    ondansetron (Zofran) 4 MG/2ML injection 4 mg  4 mg Intravenous Q6H PRN    metoclopramide (Reglan) 5 mg/mL injection 10 mg  10 mg Intravenous Q8H PRN    oxyCODONE immediate release tab 2.5 mg  2.5 mg Oral Q4H PRN    Or    oxyCODONE immediate release tab 5 mg  5 mg Oral Q4H PRN    HYDROmorphone (Dilaudid) 1 MG/ML injection 0.2 mg  0.2 mg Intravenous Q2H PRN    Or    HYDROmorphone (Dilaudid) 1 MG/ML injection 0.4 mg  0.4 mg Intravenous Q2H PRN    melatonin tab 3 mg  3 mg Oral Nightly PRN    bacitracin 500 UNIT/GM ointment   Topical Q8H PRN    oxybutynin (Ditropan) tab 5 mg  5 mg Oral Q6H PRN    benzocaine-menthol (Cepacol) lozenge 1 lozenge  1 lozenge Buccal Q15 Min PRN    sodium chloride 0.9 % irrigation 3,000 mL  3,000 mL Irrigation Continuous    fentaNYL (Sublimaze) 50 mcg/mL injection 25 mcg  25 mcg Intravenous Q30 Min PRN    Or    fentaNYL (Sublimaze) 50 mcg/mL injection 50 mcg  50 mcg Intravenous Q30 Min PRN    acetaminophen (Tylenol) tab 650 mg  650 mg Oral Q4H PRN    Or    acetaminophen (Tylenol) 160 MG/5ML oral liquid 650 mg  650 mg Oral Q4H PRN    Or    acetaminophen (Tylenol) rectal suppository 650 mg  650 mg Rectal Q4H PRN    Or    acetaminophen (Ofirmev) 10 mg/mL infusion premix 1,000 mg  1,000 mg Intravenous Q6H PRN    fentaNYL (Sublimaze) 25 mcg BOLUS FROM BAG infusion  25 mcg Intravenous Q30 Min PRN    Or    fentaNYL (Sublimaze) 50 mcg BOLUS FROM BAG infusion  50 mcg Intravenous Q30 Min PRN    fentaNYL in sodium chloride 0.9% (Sublimaze) 1000 mcg/100mL infusion premix   mcg/hr Intravenous Continuous PRN    senna (Senokot) 8.8 MG/5ML oral syrup 17.6 mg  10 mL Oral Nightly PRN    bisacodyl (Dulcolax) 10 MG rectal suppository 10 mg  10 mg Rectal Daily PRN    fleet enema (Fleet) 7-19 GM/118ML rectal enema 133 mL  1 enema Rectal Once PRN    chlorhexidine  gluconate (Peridex) 0.12 % oral solution 15 mL  15 mL Mouth/Throat BID@0800,2000    dexmedeTOMIDine in sodium chloride 0.9% (Precedex) 400 mcg/100mL infusion premix  0.2-1.5 mcg/kg/hr (Dosing Weight) Intravenous Continuous    midazolam (Versed) 2 MG/2ML injection 2 mg  2 mg Intravenous Q5 Min PRN    pantoprazole (Protonix) 40 mg in sodium chloride 0.9% PF 10 mL IV push  40 mg Intravenous QAM AC    propofol (Diprivan) 10 mg/mL infusion premix  5-50 mcg/kg/min (Dosing Weight) Intravenous Continuous     Medications Prior to Admission   Medication Sig    tamsulosin 0.4 MG Oral Cap Take 1 capsule (0.4 mg total) by mouth daily.    dutasteride 0.5 MG Oral Cap Take 1 capsule (0.5 mg total) by mouth nightly.    Sildenafil Citrate (VIAGRA) 100 MG Oral Tab Take 1 tablet by mouth daily as needed for Erectile Dysfunction.       Allergies  No Known Allergies    Review of Systems:   Review of systems not obtained due to patient factors.    Physical Exam:   Blood pressure 142/62, pulse 90, temperature 98.5 °F (36.9 °C), temperature source Temporal, resp. rate 16, height 5' 7\" (1.702 m), weight 209 lb 7 oz (95 kg), SpO2 97%.         Constitutional Normal Overall appearance - Normal.   Psychiatric Normal Orientation - Oriented to time, place, person & situation. Appropriate mood and affect.   Head/Face Normal Facial features -- Normal. Skull - Normal.   Eyes Normal Pupils equal ,round ,react to light and accomidate   Ears Normal External Ear Right: Normal, Left: Normal. Canal - Right: Normal, Left: Normal. TM - Right: Normal, Left: Normal.   Nose Normal External Nose, Normal, Septum -Midline, Right, Left Turbinates - Right: Normal, Hypertrophic Left: Normal, Hypertrophic   Mouth/Throat Normal Lips/teeth/gums - Normal. Tonsils - Normal. Oropharynx - Normal.   Neck Exam Normal Inspection - Normal. Palpation - Normal. Parotid gland - Normal. Thyroid gland - Normal.   Neurological Normal Memory - Normal. Cranial nerves - Cranial nerves  II through XII grossly intact.   Nasopharynx Normal  Normal        Skin Normal Inspection - Normal.        Lymph Detail Normal Submental. Submandibular. Anterior cervical. Posterior cervical. Supraclavicular.       Results:     Laboratory Data:  Lab Results   Component Value Date    WBC 13.9 (H) 08/15/2024    HGB 13.0 08/15/2024    HCT 37.8 (L) 08/15/2024    .0 08/15/2024    CREATSERUM 0.90 08/15/2024    BUN 13 08/15/2024     08/15/2024    K 4.0 08/15/2024     08/15/2024    CO2 23.0 08/15/2024     (H) 08/15/2024    CA 8.7 08/15/2024    ALB 3.4 08/14/2024    ALKPHO 55 08/14/2024    TP 5.7 08/14/2024    AST 24 08/14/2024    ALT 20 08/14/2024    PTT 27.3 08/07/2024    INR 0.91 08/07/2024    PTP 12.2 08/07/2024    PSA 5.15 (H) 12/01/2020    MG 1.6 08/15/2024         Imaging:  CT SOFT TISSUE OF NECK(CONTRAST ONLY) (CPT=70491)    Result Date: 8/15/2024  PROCEDURE:  CT SOFT TISSUE OF NECK(CONTRAST ONLY) (CPT=70491)  COMPARISON:  None.  INDICATIONS:  possible upper airway lesion. failed extubation  TECHNIQUE:  IV contrast-enhanced multislice CT scanning is performed through the neck soft tissues during administration of nonionic contrast.  Dose reduction techniques were used. Dose information is transmitted to the ACR (American College of Radiology) NRDR (National Radiology Data Registry) which includes the Dose Index Registry. Examination was performed and interpreted on emergency basis, as per requested STAT status.   PATIENT STATED HISTORY:(As transcribed by Technologist)  Patient is here with recent surgery. Upon extubation, Patient had difficulties with breathing   CONTRAST USED:  50cc of Isovue 370  FINDINGS:   The patient is intubated, with the endotracheal tube tip in the approximate mid trachea.  Upper aspect of the lung shows no sign of pneumothorax.  No sign of pneumomediastinum.  Adenoid soft tissue hypertrophy present.  There is also soft tissue fullness asymmetric left greater than  right tonsils.  Parapharyngeal spaces show no infiltration.  No abscess identified.  No gas collection.  No fluid collection in the neck.  Scattered normal size lymph nodes in the neck.  Major salivary glands show no abnormalities.  Normal size thyroid gland.            CONCLUSION:  Endotracheal tube tip in the approximate mid trachea.  Prominent adenoid soft tissues and left greater than right tonsillar tissues.  May reflect inflammatory changes.  After the patient has been extubated, consider direct visualization to exclude masses.  No abscess, gas collection, pneumothorax, pneumomediastinum, cyst or other fluid collection.   LOCATION:  [facility]    Dictated by (CST): Alex Severino MD on 8/15/2024 at 9:44 AM     Finalized by (CST): Alex Severino MD on 8/15/2024 at 9:48 AM       XR CHEST AP PORTABLE  (CPT=71045)    Result Date: 8/15/2024  PROCEDURE:  XR CHEST AP PORTABLE  (CPT=71045)  TECHNIQUE:  AP chest radiograph was obtained.  COMPARISON:  [facility] , XR, XR CHEST AP PORTABLE  (CPT=71045), 8/14/2024, 7:01 PM.  INDICATIONS:  Respiratory failure.  Follow-up.  PATIENT STATED HISTORY: (As transcribed by Technologist)  Patient offered no additional information at this time.    FINDINGS:  Lung volumes are satisfactory.  Minimal to mild reticular opacities at the left base appear unchanged and may be any combination of scar, atelectasis or pneumonitis.  There is a new curvilinear band of opacity in right mid to lower lung consistent with subsegmental atelectasis.  CP angles remain sharp.  No pneumothorax.  Heart and pulmonary vessels appear stable, normal caliber allowing for portable technique.  Smooth mediastinal contours.  Endotracheal tube, tip approximately 4 cm from  the feliciano.             CONCLUSION:  New band of subsegmental atelectasis in the right mid to lower lung.  Other findings appear stable.   LOCATION:  [facility]      Dictated by (CST): Ramiro Marx MD on 8/15/2024 at 8:27 AM     Finalized by (CST): Francisco J  MD Ramiro on 8/15/2024 at 8:29 AM       XR CHEST AP PORTABLE  (CPT=71045)    Result Date: 8/14/2024  PROCEDURE:  XR CHEST AP PORTABLE  (CPT=71045)  TECHNIQUE:  AP chest radiograph was obtained.  COMPARISON:  None.  INDICATIONS:  Post intubation  PATIENT STATED HISTORY: (As transcribed by Technologist)  Patient offered no additional history at this time.     FINDINGS:  Endotracheal tube terminates approximately 4.7 centimeters above the feliciano.  Mild left basilar airspace linear opacity, which may be related to atelectasis though superimposed infection/inflammation and/or aspiration is not excluded, correlate clinically.  No pleural effusion.  No pneumothorax.  No pulmonary edema.  The cardiomediastinal silhouette is within normal limits.  No acute osseous findings.            CONCLUSION:  See above   LOCATION:  [facility]      Dictated by (CST): Leonard Hughes MD on 8/14/2024 at 7:08 PM     Finalized by (CST): Leonard Hughes MD on 8/14/2024 at 7:10 PM           Impression:   Upper airway obstruction.  CT reviewed and I don't see any significant mass.  At bedside for extubation and patient doing well.    Recommendations:  Follow up with me as outpatient.       Thank you for allowing me to participate in the care of your patient.    Duarte Sears MD  8/15/2024

## 2024-08-15 NOTE — RESPIRATORY THERAPY NOTE
Vent Weaning Parameters    Patient meet criteria for weaning Yes; comment:    Weaning was attempted Yes  Using Pressure Support of 5 & PEEP 5 FiO2 40%    Parameters Pre SBT:  RSBI 98  NIF-21  VC 1474  PIP 22    The patient tolerated well  For 1 hour    Outcome (was patient extubated / put back to current settings): Pt extubated to 2 L NC.

## 2024-08-15 NOTE — PROGRESS NOTES
08/14/24 2211   Vent Information   $ RT Standby Charge (per 15 min) 1   Vent Initiation Date 08/14/24   Vent Initiation Time 1752   Ventilation Day(s) 1   Interface Invasive   Vent Type PB   Vent plugged into main power? Yes   Vent ID [id number]   Vent Mode VC+   Settings   FiO2 (%) 40 %   Resp Rate (Set) 14   Vt (Set, mL) 600 mL   PEEP/CPAP (cm H2O) 5 cm H20   Insp Time (sec) 1 sec   Insp Rise Time (%) 50 %   Humidification Heater   H2O Bag Level 3/4 Full   Heater Temperature 98.6 °F (37 °C)   Readings   Total RR 14   Minute Ventilation (L/min) 8.5 L/min   Inspiratory Tidal Volume 600 mL   Expiratory Tidal Volume 608 mL   PIP Observed (cm H2O) 22 cm H2O   MAP (cm H2O) 9.5   I/E Ratio 1:3.3   Plateau Pressure (cm H2O) 20 cm H2O   Alarms   High RR 40   Insp Pressure High (cm H2O) 40 cm H2O   MV High (L/min) 20 L/min   MV Low (L/min) 3 L/min   Apnea Interval (sec) 20 seconds   Apnea Rate 14   Apnea Volume (mL) 600 mL   ETT   Placement Date/Time: 08/14/24 1745   Placed by External Staff?: (c)   Airway Size: 7.5 mm  Cuffed: Cuffed   Secured at (cm) 24 cm   Suctioned? N   Measured From Lips   Secured Location Center   Secured by Commercial tube ibrahim   Site Condition Dry   Site changed Rotated   Req'd equipment at bedside Bag mask     Pt on above settings doing well on current settings

## 2024-08-15 NOTE — PROGRESS NOTES
[facility]  Urology Progress Note    Troy Vidal Patient Status:  Outpatient in a Bed    1955 MRN [id number]   Location [facility] 4SW-A Attending Ben Ramires MD   Hosp Day # 0 PCP TROY OJEDA     Subjective:  Troy Vidal is a(n) 69 year old male.    S/P robotic-assisted laparoscopic simple prostatectomy 24 with Dr. Ramires.    Upon extubation, patient had difficulties with breathing. He was reintubated by anesthesia. He will be transferred to the ICU     Current complaints: Intubated    Wife at bedside.      Objective:  General appearance:  intubated.  Blood pressure 123/65, pulse 67, temperature 97.9 °F (36.6 °C), temperature source Temporal, resp. rate 14, height 5' 7\" (1.702 m), weight 209 lb 7 oz (95 kg), SpO2 100%.  Abdomen: soft. Incisions C/D/I.  CRISTIANA drain with reddish output  : rosales catheter in place draining blood tinged urine with CBI.    Lab Results   Component Value Date    WBC 13.9 08/15/2024    HGB 13.0 08/15/2024    HCT 37.8 08/15/2024    .0 08/15/2024    CREATSERUM 0.90 08/15/2024    BUN 13 08/15/2024     08/15/2024    K 4.0 08/15/2024     08/15/2024    CO2 23.0 08/15/2024     08/15/2024    CA 8.7 08/15/2024    ALB 3.4 2024    ALKPHO 55 2024    BILT 0.7 2024    TP 5.7 2024    AST 24 2024    ALT 20 2024    MG 1.6 08/15/2024       XR CHEST AP PORTABLE  (CPT=71045)    Result Date: 2024  CONCLUSION:  See above   LOCATION:  [facility]      Dictated by (CST): Leonard Hughes MD on 2024 at 7:08 PM     Finalized by (CST): Leonard Hughes MD on 2024 at 7:10 PM         Assessment:    BPH WITH LUTS, URINARY RETENTION  S/P robotic-assisted laparoscopic simple prostatectomy 24 with Dr. Ramires  Afebrile, VSS  WBC 14.4 > 13.9  Hgb 14.1 > 13  Platelet count 143 > 158  Serum creat 1 > 0.9    AIRWAY OBSTRUCTION S/P SURGERY, POSSIBLE MASS ON UVULA  -intubated    Plan:    Wean CBI. Monitor color/clarity of  urine  Follow lab, temp, drain output  CPM with rosales catheter until post-op follow-up  ENT consultation  Further management per ICU team.    Above discussed with wife, nurse  Will update Dr. Ramires.      Dr. Ramires to see patient today.      Riri Macias PA-C  [facility]  Department of Urology  8/15/2024  7:08 AM

## 2024-08-15 NOTE — PAYOR COMM NOTE
--------------  ADMISSION REVIEW     Payor: [employer]  Subscriber #:  [id number]  Authorization Number: [id number]    Admit date: 8/14/24  Admit time:  3:59 PM       REVIEW DOCUMENTATION:  Urology Pre OP H&P    Chief Complaint:   BPH with LUTS and urinary retention.    History of Present Illness:   69 year old male who presents today for evaluation of BPH with LUTS. He is referred by Dr. Gordon Garcia to discuss surgical options for his significant BPH.     He has seen Dr. Love and Dr. Garcia in the past. Prostate MRI in August, 2023 showed an ~190 mL gland, and was read as PI-RADS II -low probability for clinically significant prostate cancer. He has a history of an elevated PSA.    He recently developed urinary retention, necessitating rosales placement. He was started on tamsulosin and dutasteride recently by Dr. Garcia. He had a rosales placed, with drainage of ~900mL of urine in the office yesterday.    Dr. Garcia recommended he meets with me to discuss a robotic-assisted lap. Simple prostatectomy.     He denies a prior abdominal surgical history. He does not take any antiplatelet medications or anticoagulants.     Past Medical History:   Diagnosis Date   BPH (benign prostatic hyperplasia)   Elevated PSA     Past Surgical History:   Procedure Laterality Date   COLONOSCOPY N/A 3/1/2019   Procedure: COLONOSCOPY, POSSIBLE BIOPSY, POSSIBLE POLYPECTOMY 96217; Surgeon: Sha Lord MD; Location: [facility]   OTHER SURGICAL HISTORY 3/2/10   prostate bx-Dr. Love   SKIN SURGERY 2/8/12   MMS to Left malar cheek for BCC 2/8/12     Allergies:  Patient has no known allergies.    Review of Systems:   General: Denies anorexia, weight loss, fevers, chills, night sweats, or other constitutional symptoms.   Neurologic: Denies headaches, stiff neck, photophobia, numbness, or weakness of extremities.   Psychiatric: Denies anxiety, depression.  Eyes: Denies vision changes.  Skin: Denies skin changes or  rash.  Cardiac: Denies chest pain, palpitations, or orthopnea.  Pulmonary: Denies cough, hemoptysis, shortness of breath, or dyspnea on exertion.  GI: Denies abdominal pain, nausea, vomiting, or changes in bowel movements.  Musculoskeletal: Denies extremity pain, weakness.  : See HPI.    Physical Examination:   There were no vitals taken for this visit.  There is no height or weight on file to calculate BMI.  General: Awake, Alert, Oriented. Well-nourished. Appears stated age.  Neurologic: No focal deficits. Normal gait.  HEENT: EOMI. No scleral icterus.  Cardiac: Regular rate and rhythm.  Respiratory: Non-labored respirations.  Abdomen: Soft, Non-tender, non-distended. No palpable masses. No costovertebral angle tenderness.  Extremities: Warm, well-perfused. No palpable edema.  Skin: Normal-appearing. No rash or lesions.  Genitourinary: Anaya in place draining yellow urine.     Prostate MRI 8/12/2023:  PI-RADS II: Low (clinically significant cancer is unlikely to be present) 189.7mL gland.    Assessment:   In summary, 69 year old male with BPH with LUTS and urinary retention.    Plan:   We reviewed surgical options for men with symptomatic BPH. He has a significantly enlarged gland, measuring ~190 mL on a prostate MRI from late 2023. We discussed that appropriate options for men with a gland his size include a HoLEP, Aquablation, or a robotic-assisted lap. Simple prostatectomy.     To OR for robotic-assisted lap. Simple prostatectomy. This is a procedure in which the BPH tissue is enucleated transvesically. This procedure is able to address men with very large glands effectively    Risks include bleeding, infection, damage to surrounding structures including the bladder, rectum, urethra, or ureters, the risk of urinary fistula, bladder neck contracture, urethral stricture, urinary incontinence, erectile dysfunction, failure of the procedure to alleviate his LUTS or retention, as well as the risks of general  anesthesia.      I have discussed the risks, benefits and alternatives to the proposed procedure/treatment plan with the patient.  Our discussion also included the risks and benefits of the alternatives treatment options, including doing nothing. They were encouraged to ask questions and all questions were answered to their satisfaction.  At the end of our discussion they gave their verbal consent to the proposed procedure/treatment plan.  Ben Ramires MD  8/14/2024 11:20 AM     Operative Report    PREOPERATIVE DIAGNOSIS:  BPH with LUTS, Urinary Retention     POSTOPERATIVE DIAGNOSIS:  Same     PROCEDURES PERFORMED:  Robotic-assisted laparoscopic simple prostatectomy     SURGEON:  Ben Ramires MD     ASSISTANTS:  Florentin Villasenor     ANESTHESIA:  General Endotracheal     ANTIBIOTIC PROPHYLAXIS:  2g Ancef     SPECIMENS:  Prostate adenoma     ESTIMATED BLOOD LOSS:  100mL     DRAINS:  16F Anaya catheter (15mL in balloon)  15F Jordan drain     COMPLICATIONS:  No immediate complications     INDICATIONS FOR PROCEDURE:  69 year old gentleman with a known history of BPH with LUTS who recently developed urinary retention. He has a ~190mL gland on prior prostate MRI from late 2023. We had previously met and discussed treatment options, including a TURP, HoLEP, or robotic simple prostatectomy. He elected to proceed with a robotic-assisted laparoscopic simple prostatectomy. Please see my pre-operative and office notes for procedural counseling, including the risks of the procedure.      DESCRIPTION OF PROCEDURE:  After reviewing the indications for the procedure, informed consent was reviewed and signed by the patient.     The patient was brought to the operating room and placed in the supine position on the OR table.  SCD's were applied and all pressure points were carefully padded.  At this point, general endotracheal anesthesia was successfully induced.  The patient was then given perioperative antibiotics and subcutaneous  enoxaparin prior to initiation of the procedure.  At this point, he remained in the supine position and his abdomen was prepped with chlorhexidine and his genitals and upper thighs were prepped with betadine solution.  He was draped in the usual sterile fashion.  A 16F rosales catheter was placed using a sterile technique and the balloon was inflated with 10cc of sterile water. The bladder was drained fully.  We placed the patient in a steep Trendelenburg position to ensure there was no motion of the patient on the bed. At this point, a procedural timeout was performed where the patient and procedure were correctly identified using the patient's full name and date of birth.     A Veress needle was inserted above the umbilicus into the peritoneal cavity.  A drop test was performed to ensure there was no return of blood or enteric contents.  At this point insufflation was initiated and there were acceptably low opening pressures.  The abdomen was then insufflated to 15mmHg.  At this point a transverse 8mm incision was made in the supraumbilical region through which a non-bladed 8mm trocar was inserted into the peritoneal cavity.  The camera was then passed through this port and the peritoneum was inspected. There was no evidence of intraabdominal pathology.  There were a few sigmoid colon adhesions noted to the pelvic sidewall.       At this point, the remainder of the working ports were placed under direct vision.  Two 8mm ports were placed in the left lower quadrant, an 8mm port was placed in the right lower quadrant, and a 12mm assistant port was placed in the far right lower quadrant.  There was no evidence of injury to the intraperitoneal contents during port placement.  The patient was again placed in steep Trendelenburg position and the robot was then docked.     All instruments were then placed under direct vision.  I began by taking down the attachments of the sigmoid colon to the left pelvic sidewall in order  to identify the external iliac vessels and ensure mobility of the sigmoid colon and rectum.      I then filled the bladder with 180 mL of sterile saline, through the previously placed rosales catheter.  I made a vertical cystotomy and entered the bladder.  Soy needles on a 0 silk suture were placed in the bilateral lower quadrants and used to retract the lateral aspects of the bladder to create space within the bladder lumen. I identified his enlarged prostate gland, with a moderate sized intravesical median lobe.      I then scored the bladder mucosa at the junction of the bladder and prostate.  I dissected down onto the prostate adenoma.  I then used a combination of blunt and sharp dissection as well as spot cautery to circumferentially dissect the prostate adenoma from the prostatic capsule.  Hemostasis was fairly good throughout.  I came around the apex of the adenoma and identified the rosales catheter in the midline.  Care was taken to sharply release the adenoma at the apex, to prevent any injury to the external sphincter. Once the adenoma was freed, it was placed into an endo catch bag for later retrieval.      I then used a 3-0 v lock suture at 5 and 7 o'clock to oversew any pedicle vessels.  I placed Surgiflo in the prostatic fossa.  I then performed a bladder neck advancement, by suturing the posterior bladder neck to the posterior urethra.  I then closed the bladder neck using the same v-lock suture. A 20F 3 way rosales was placed and the balloon was inflated with 20mL of sterile water.      I then closed the vertical cystotomy in 2 layers, first using a 3-0 v lock suture, and then a 2-0 v lock suture in a running fashion. The bladder was filled with 150mL of sterile water and appeared water tight.      I placed a 15F sal drain through the robotic 4th arm incision and this was placed into the pelvis. It was secured to his skin using a nylon suture.      We then closed the 12mm assistant port using a 0 PDS  and an endo close device. We then extracted the endo catch bag by extending the supraumbilical 8mm incision.  Fascia was closed using a 0 PDS suture in a figure-of-eight fashion.      Skin was closed using 4-0 Monocryl sutures in a subcuticular fashion.  Surgical glue was placed over each incision site.       The patient was then awoken from anesthesia and transferred to the recovery room in stable condition. His catheter was irrigated and was wine-colored with some clots, so I elected to start a moderate CBI drip post op.  Urine was a pink to cherry color on a slow to moderate CBI drip.      He tolerated the procedure well without any complications.     At the completion of the procedure, our sponge, instrument, and needle count was correct x2.     Florentin Villasenor served as my bedside assistant for the entirety of the procedure.  I was present for the entirety of the procedure and completed all major portions myself.     PLAN:  He will be admitted to the surgical floor post-operatively for for 1-2 nights. His rosales catheter will remain in place until post-operative follow-up.  Continue CBI overnight. Hand irrigate gently as needed.     Urology Update Note:   Upon extubation, patient had difficulties with breathing.  He was reintubated by anesthesia.  He will be transferred to the ICU post-op.  Anesthesia recommended an ENT consult for further assessment of his airway.       I updated his wife and evaluated the patient in the ICU. Post-op labs noted and appear appropriate. His urine is light pink on a moderate CBI drip currently.      His wife is aware of the plan to keep him intubated overnight and to have ENT assess him.  Ben Ramires MD  8/14/2024  6:51 PM     ICU  Critical Care APRN Progress Note  69 year old male with PMHx significant for BPH and high cholesterol presents to the hospital for scheduled TURP.  Post extubation, patient developed difficulty breathing, oral and nasal airway with jaw thrust performed  without significant improvement.  Patient reintubated and admitted to ICU for airway and hemodynamic monitoring.       Patient to ICU on bed. Patient intubated and sedated.     /66   Pulse 90   Temp 97.3 °F (36.3 °C) (Temporal)   Resp 15   Ht 170.2 cm (5' 7\")   Wt 209 lb 7 oz (95 kg)   SpO2 100%   BMI 32.80 kg/m²              Ventilator Settings: VC+ 16// FIO2 100/PEEP 5.    Assessment/Plan:  Airway obstruction s/p surgery, possible mass on uvula  -Check cuff leak prior to extubation  -Consult ENT for possible mass  -Steroids per ENT  S/P TURP  -post-op orders per urology  -CBI  BPH  -continue flomax  F/E/N  -NPO  -IVF  -replete electrolytes as needed  Proph  -SCD  -heparin when ok per urology  Dispo  -full code  -ICU for risk of deterioration  Plan of care discussed with intensivist on-call, Dr. Marte.  Lottie Jiang [facility]  Critical Care NP  8/14/2024  6:14 PM     8/15/2024:  Urology Progress Note   Remains Intubated     Blood pressure 123/65, pulse 67, temperature 97.9 °F (36.6 °C), temperature source Temporal, resp. rate 14, height 5' 7\" (1.702 m), weight 209 lb 7 oz (95 kg), SpO2 100%.    Abdomen: soft. Incisions C/D/I.  CRISTIANA drain with reddish output  : rosales catheter in place draining blood tinged urine with CBI.      Component Value Date     WBC 13.9 08/15/2024     HGB 13.0 08/15/2024     HCT 37.8 08/15/2024     .0 08/15/2024     CREATSERUM 0.90 08/15/2024     BUN 13 08/15/2024      08/15/2024     K 4.0 08/15/2024      08/15/2024     CO2 23.0 08/15/2024      08/15/2024     CA 8.7 08/15/2024     MG 1.6 08/15/2024     Assessment:    BPH WITH LUTS, URINARY RETENTION  S/P robotic-assisted laparoscopic simple prostatectomy 8/14/24 with Dr. Kadow  Afebrile, VSS  WBC 14.4 > 13.9  Hgb 14.1 > 13  Platelet count 143 > 158  Serum creat 1 > 0.9     AIRWAY OBSTRUCTION S/P SURGERY, POSSIBLE MASS ON UVULA  -intubated     Plan:    Wean CBI. Monitor color/clarity of  urine  Follow lab, temp, drain output  CPM with rosales catheter until post-op follow-up  ENT consultation  Further management per ICU team.     Above discussed with wife, nurse  Will update Dr. Ramires.       Dr. Ramires to see patient today.    Riri Macias PA-C  8/15/2024  7:08 AM    MEDICATIONS ADMINISTERED IN LAST 1 DAY:  acetaminophen (Ofirmev) 10 mg/mL infusion premix 1,000 mg       Date Action Dose Route User    8/14/2024 1833 New Bag 1,000 mg Intravenous Brenda Andrade RN    8/14/2024 1814 New Bag 1,000 mg Intravenous Brenda Andrade RN          bupivacaine PF (Marcaine) 0.25% injection       Date Action Dose Route User    8/14/2024 1540 Given 30 mL Infiltration (Abdominal Folds) Ben Ramires MD          bupivacaine 0.25%-EPINEPHrine 1:200,000 PF (Marcaine/Epinephrine PF) injection       Date Action Dose Route User    8/14/2024 1617 Given 30 mL Infiltration Lazara Manzo CRNA    8/14/2024 1614 Given 30 mL Infiltration Lazara Manzo CRNA          ceFAZolin (Ancef) 2g in 10mL IV syringe premix       Date Action Dose Route User    8/14/2024 1308 Given 2 g Intravenous Lazara Manzo CRNA          chlorhexidine gluconate (Peridex) 0.12 % oral solution 15 mL       Date Action Dose Route User    8/15/2024 0823 Given 15 mL Mouth/Throat Eleazar John RN    8/14/2024 2011 Given 15 mL Mouth/Throat Dina Tellez, ZAIDA          dexamethasone (Decadron) 4 MG/ML injection       Date Action Dose Route User    8/14/2024 1310 Given 8 mg Intravenous Lazara Manzo CRNA          dextrose in lactated ringers 5% infusion       Date Action Dose Route User    8/14/2024 1900 Rate/Dose Verify (none) Intravenous Brenda Andrade RN    8/14/2024 1840 Rate/Dose Verify (none) Intravenous Brenda Andrade RN    8/14/2024 1816 New Bag (none) Intravenous Brenda Andrade RN          enoxaparin (Lovenox) 40 MG/0.4ML SUBQ injection 40 mg       Date Action Dose Route User    8/14/2024 1009 Given 40 mg Subcutaneous (Left Lower Abdomen) Manuela  Charo FELIX RN          enoxaparin (Lovenox) 40 MG/0.4ML SUBQ injection 40 mg       Date Action Dose Route User    8/15/2024 0823 Given 40 mg Subcutaneous (Left Upper Abdomen) Eleazar Jhon RN          EPHEDrine 50 MG/ML injection       Date Action Dose Route User    8/14/2024 1311 Given 5 mg Intravenous Lazara Manzo CRNA          fentaNYL (Sublimaze) 25 mcg BOLUS FROM BAG infusion       Date Action Dose Route User    8/15/2024 0450 Bolus from Bag 25 mcg Intravenous Dina Tellez RN    8/14/2024 2350 Bolus from Bag 25 mcg Intravenous Dina Tellez RN    8/14/2024 2010 Bolus from Bag 25 mcg Intravenous Dina Tellez RN          fentaNYL (Sublimaze) 50 mcg BOLUS FROM BAG infusion       Date Action Dose Route User    8/14/2024 1822 Bolus from Bag 50 mcg Intravenous Brenda Andrade RN          fentaNYL in sodium chloride 0.9% (Sublimaze) 1000 mcg/100mL infusion premix       Date Action Dose Route User    8/14/2024 1900 Hi-Risk Rate/Dose Verify 25 mcg/hr Intravenous Brenda Andrade RN    8/14/2024 1808 New Bag 25 mcg/hr Intravenous Brenda Andrade RN          fentaNYL (Sublimaze) 50 mcg/mL injection       Date Action Dose Route User    8/14/2024 1403 Given 50 mcg Intravenous Lazara Manzo CRNA    8/14/2024 1315 Given 50 mcg Intravenous Lazara Manzo CRNA    8/14/2024 1251 Given 50 mcg Intravenous Lazara Manzo CRNA          fentaNYL (Sublimaze) 50 mcg/mL injection 50 mcg       Date Action Dose Route User    8/14/2024 1833 Given 50 mcg Intravenous Brenda Andrade RN          glycopyrrolate (Robinul) 0.2 MG/ML injection       Date Action Dose Route User    8/14/2024 1619 Given 0.8 mg Intravenous Lazara Manzo CRNA          iopamidol 76% (ISOVUE-370) injection for power injector       Date Action Dose Route User    8/15/2024 0929 Given 50 mL Intravenous Sincere Davidson          ketamine (Ketalar) 50 MG/ML injection       Date Action Dose Route User    8/14/2024 1310 Given 25 mg Intravenous Lazara Manzo CRNA           ketorolac (Toradol) 30 MG/ML injection       Date Action Dose Route User    8/14/2024 1544 Given 30 mg Intravenous Lazara Manzo CRNA          lactated ringers infusion       Date Action Dose Route User    8/14/2024 1705 New Bag (none) Intravenous Lazara Manzo CRNA    8/14/2024 1244 Restarted (none) Intravenous Lazara Manzo CRNA    8/14/2024 1010 New Bag (none) Intravenous Charo Roy RN          lactated ringers infusion       Date Action Dose Route User    8/14/2024 1457 New Bag (none) Intravenous Lazara Manzo CRNA    8/14/2024 1300 New Bag (none) Intravenous Lazara Manzo CRNA          lactated ringers infusion       Date Action Dose Route User    8/15/2024 0647 New Bag (none) Intravenous Dina Tellez RN    8/14/2024 2100 New Bag (none) Intravenous Dina Tellez, ZAIDA          lidocaine PF (Xylocaine-MPF) 1% injection       Date Action Dose Route User    8/14/2024 1251 Given 50 mg Injection Lazara Manzo CRNA          lidocaine in dextrose 5% 8-5 MG/ML-% infusion premix       Date Action Dose Route User    8/14/2024 1305 New Bag 2 mg/kg/hr × 66.1 kg (Ideal) Intravenous Lazara Manzo CRNA          magnesium sulfate in sterile water for injection 2 g/50mL IVPB premix 2 g       Date Action Dose Route User    8/15/2024 0600 New Bag 2 g Intravenous Dina Tellez, ZAIDA          methylPREDNISolone sodium succinate (Solu-MEDROL) injection       Date Action Dose Route User    8/14/2024 1648 Given 125 mg Intravenous Lazara Manzo CRNA          metoclopramide (Reglan) 5 mg/mL injection       Date Action Dose Route User    8/14/2024 1310 Given 20 mg Intravenous Lazara Manzo CRNA          midazolam (Versed) 2 MG/2ML injection       Date Action Dose Route User    8/14/2024 1731 Given 2 mg Intravenous Lazara Manzo CRNA    8/14/2024 1248 Given 1 mg Intravenous Lazara Manzo CRNA    8/14/2024 1244 Given 1 mg Intravenous Lazara Manzo CRNA          neostigmine (Bloxiverz)  10 mg/10mL injection       Date Action Dose Route User    8/14/2024 1619 Given 5 mg Intravenous Lazara Manzo CRNA          ondansetron (Zofran) 4 MG/2ML injection       Date Action Dose Route User    8/14/2024 1537 Given 4 mg Intravenous Lazara Manzo CRNA          pantoprazole (Protonix) 40 mg in sodium chloride 0.9% PF 10 mL IV push       Date Action Dose Route User    8/15/2024 0600 Given 40 mg Intravenous Dina Tellez RN          phenylephrine (Sachin-Synephrine) 10 MG/ML injection       Date Action Dose Route User    8/14/2024 1735 Given 100 mcg Intravenous Lazara Manzo CRNA    8/14/2024 1733 Given 100 mcg Intravenous Lazara Manzo CRNA    8/14/2024 1731 Given 100 mcg Intravenous Lazara Manzo CRNA    8/14/2024 1728 Given 100 mcg Intravenous Lazara Manzo CRNA          propofol (Diprivan) 10 MG/ML injection       Date Action Dose Route User    8/14/2024 1720 Given 150 mg Intravenous Lazara Manzo CRNA    8/14/2024 1251 Given 180 mg Intravenous Lazara Manzo CRNA          propofol (Diprivan) 10 mg/mL infusion premix       Date Action Dose Route User    8/15/2024 0648 New Bag 25 mcg/kg/min × 85.7 kg (Dosing Weight) Intravenous Dina Tellez RN    8/15/2024 0600 Rate/Dose Change 25 mcg/kg/min × 85.7 kg (Dosing Weight) Intravenous Dina Tellez RN    8/15/2024 0515 Rate/Dose Change 30 mcg/kg/min × 85.7 kg (Dosing Weight) Intravenous Dina Tellez RN    8/15/2024 0430 Rate/Dose Change 25 mcg/kg/min × 85.7 kg (Dosing Weight) Intravenous Dina Tellez RN    8/15/2024 0405 Rate/Dose Change 20 mcg/kg/min × 85.7 kg (Dosing Weight) Intravenous Dina Tellez RN    8/15/2024 0100 Rate/Dose Change 25 mcg/kg/min × 85.7 kg (Dosing Weight) Intravenous Dina Tellez RN    8/15/2024 0000 New Bag 20 mcg/kg/min × 85.7 kg (Dosing Weight) Intravenous Dina Tellez RN    8/14/2024 2203 Rate/Dose Change 20 mcg/kg/min × 85.7 kg (Dosing Weight) Intravenous Dina Tellez RN    8/14/2024  2150 Rate/Dose Change 25 mcg/kg/min × 85.7 kg (Dosing Weight) Intravenous Dina Tellez RN    8/14/2024 1831 Rate/Dose Change 30 mcg/kg/min × 85.7 kg (Dosing Weight) Intravenous Brenda Andrade RN    8/14/2024 1821 Rate/Dose Change 20 mcg/kg/min × 85.7 kg (Dosing Weight) Intravenous Brenda Andrade RN    8/14/2024 1753 New Bag 10 mcg/kg/min × 85.7 kg (Dosing Weight) Intravenous Brenda Andrade RN          EPINEPHrine-racemic (S-2) 2.25 % nebulizer solution       Date Action Dose Route User    8/14/2024 1658 Given 0.5 mL Nebulization Lazara Manzo CRNA          rocuronium (Zemuron) 50 mg/5mL injection       Date Action Dose Route User    8/14/2024 1729 Given 20 mg Intravenous Lazara Manzo CRNA    8/14/2024 1518 Given 15 mg Intravenous Lazara Manzo CRNA    8/14/2024 1450 Given 20 mg Intravenous Lazara Manzo CRNA    8/14/2024 1341 Given 20 mg Intravenous Lazara Manzo CRNA    8/14/2024 1252 Given 50 mg Intravenous Lazara Manzo CRNA          sodium chloride 0.9 % irrigation 3,000 mL       Date Action Dose Route User    8/15/2024 0606 New Bag 3,000 mL Irrigation Dina Tellez, RN    8/15/2024 0605 New Bag 3,000 mL Irrigation Dina Tellez, ZAIDA    8/15/2024 0203 New Bag 3,000 mL Irrigation Dina Tellez, ZAIDA    8/14/2024 2204 New Bag 3,000 mL Irrigation Dina Tellez, ZAIDA          succinylcholine (Anectine) 20 MG/ML injection       Date Action Dose Route User    8/14/2024 1721 Given 100 mg Intravenous Lazara Manzo CRNA          sugammadex (Bridion) 200 MG/2ML injection       Date Action Dose Route User    8/14/2024 1635 Given 200 mg Intravenous Lazara Manzo CRNA     Vitals (last day)       Date/Time Temp Pulse Resp BP SpO2 Weight O2 Device O2 Flow Rate (L/min) [facility]    08/15/24 0800 98.5 °F (36.9 °C) 71 12 142/62 100 % -- Ventilator -- MS    08/15/24 0746 -- 71 14 -- 100 % -- -- -- AD    08/15/24 0700 -- 67 14 123/65 100 % -- -- -- ZS    08/15/24 0600 -- 62 14 98/55 99 % -- -- -- ZS     08/15/24 0500 -- 68 15 126/68 100 % -- -- -- ZS    08/15/24 0400 97.9 °F (36.6 °C) 60 14 101/55 99 % -- Ventilator -- ZS    08/15/24 0300 -- 63 15 108/55 100 % -- -- -- ZS    08/15/24 0200 -- 56 14 112/67 100 % -- -- -- ZS    08/15/24 0100 -- 67 14 127/68 100 % -- -- -- ZS    08/15/24 0000 97.9 °F (36.6 °C) 66 15 122/71 100 % -- Ventilator -- ZS    08/14/24 2300 -- 61 14 123/74 100 % -- -- -- ZS    08/14/24 2211 -- 61 14 -- 100 % -- -- -- JT    08/14/24 2200 -- 63 14 111/61 100 % -- -- -- ZS    08/14/24 2100 -- 65 14 108/64 99 % -- -- -- ZS    08/14/24 2000 99 °F (37.2 °C) 71 14 129/64 99 % -- Ventilator -- ZS    08/14/24 1915 -- 82 15 131/67 99 % -- -- -- AM    08/14/24 1900 -- 64 14 127/69 100 % -- -- -- AM    08/14/24 1845 97.6 °F (36.4 °C) 72 14 120/70 99 % -- -- -- AM    08/14/24 1830 97.5 °F (36.4 °C) 92 19 150/64 99 % -- Ventilator -- AM    08/14/24 1815 -- 99 26 168/85 100 % -- -- -- AM    08/14/24 1800 97.3 °F (36.3 °C) 95 15 157/78 100 % 209 lb 7 oz (95 kg) Ventilator -- AM    08/14/24 1756 97.7 °F (36.5 °C) 90 15 134/66 100 % -- -- -- SP    08/14/24 1747 97.7 °F (36.5 °C) 87 14 134/66 100 % -- Ventilator -- BS    08/14/24 1006 98.6 °F (37 °C) 65 16 148/68 98 % 189 lb (85.7 kg) None (Room air) -- RL       FOR REVIEW/APPROVAL OF INPT ADMISSION

## 2024-08-15 NOTE — PROGRESS NOTES
Received patient at change of shift intubated and sedated with propofol, fentanyl infusing for pain. Three way rosales in place for CBI. CRISTIANA to LLQ with bloody output. Discussed POC with Dr. Sears - RN to call in morning when planning on extubating, MD would like to be at bedside during extubation. Spouse at bedside, updated on POC. Electrolytes replaced per protocol.    Patient had gold wedding ring on - taken off and given to wife.

## 2024-08-15 NOTE — PROGRESS NOTES
Troy Vidal Patient Status:  Outpatient in a Bed    1955 MRN [id number]   [facility] 4SW-A Attending Ben Ramires MD   Hosp Day # 1 PCP TROY OJEDA     Critical Care Progress Note          Subjective:  No overnight events. Patient is awake on a breathing trial with no complaints.    Objective:    Medications:   tamsulosin  0.4 mg Oral Daily    enoxaparin  40 mg Subcutaneous Daily    sennosides  17.2 mg Oral Nightly    docusate sodium  100 mg Oral BID    chlorhexidine gluconate  15 mL Mouth/Throat BID@0800,    pantoprazole  40 mg Intravenous QAM AC       Vent Mode: PS  FiO2 (%):  [40 %-100 %] 40 %  S RR:  [14] 14  S VT:  [600 mL] 600 mL  PEEP/CPAP (cm H2O):  [5 cm H20] 5 cm H20  MAP (cm H2O):  [9.5-10] 9.6      Intake/Output Summary (Last 24 hours) at 8/15/2024 0852  Last data filed at 8/15/2024 0630  Gross per 24 hour   Intake 4482.17 ml   Output 2240 ml   Net 2242.17 ml       /62   Pulse 71   Temp 98.5 °F (36.9 °C) (Temporal)   Resp 12   Ht 170.2 cm (5' 7\")   Wt 209 lb 7 oz (95 kg)   SpO2 100%   BMI 32.80 kg/m²     Physical Exam:   General Appearance: Alert, cooperative, no distress, appears stated age  Neck: No JVD, neck supple, no adenopathy, trachea midline  Lungs: Clear to auscultation bilaterally, respirations unlabored  Heart: Regular rate and rhythm, S1 and S2 normal, no murmur, rub or gallop  Abdomen: Soft, round, tender, bowel sounds active all four quadrants, no masses, no organomegaly  Extremities: Extremities normal, atraumatic, no cyanosis or edema,capillary refill <3 sec.    Pulses: 2+ and symmetric all extremities  Skin: Skin color, texture, turgor normal for ethnicity, no rashes or lesions, warm and dry. Lap sites clean/dry/intact  Neurologic: Normal strength    Recent Labs   Lab 08/15/24  0453   RBC 4.15   HGB 13.0   HCT 37.8*   MCV 91.1   MCH 31.3   MCHC 34.4   RDW 12.3   WBC 13.9*   .0     Recent Labs   Lab 24  1756 08/15/24  4075    *  202* 142*   BUN 9  9 13   CREATSERUM 1.00  1.00 0.90   CA 8.5*  8.5* 8.7   ALB 3.4  --      137 138   K 3.6  3.6 4.0     106 107   CO2 21.0  21.0 23.0   ALKPHO 55  --    AST 24  --    ALT 20  --    BILT 0.7  --    TP 5.7  --      Recent Labs   Lab 08/14/24  1918   ABGPHT 7.40   [id number] 37   SVESK8E 346*   ABGHCO3 23.8   ABGBE -1.5   TEMP 98.6   HIGINIO Positive   SITE Left Radial   DEV  adult   THGB 13.3     No results for input(s): \"BNP\" in the last 168 hours.  No results for input(s): \"TROP\", \"CK\" in the last 168 hours.    XR CHEST AP PORTABLE  (CPT=71045)    Result Date: 8/15/2024  CONCLUSION:  New band of subsegmental atelectasis in the right mid to lower lung.  Other findings appear stable.   LOCATION:  [facility]      Dictated by (CST): Ramiro Marx MD on 8/15/2024 at 8:27 AM     Finalized by (CST): Ramiro Marx MD on 8/15/2024 at 8:29 AM       XR CHEST AP PORTABLE  (CPT=71045)    Result Date: 8/14/2024  CONCLUSION:  See above   LOCATION:  [facility]      Dictated by (CST): Leonard Hughes MD on 8/14/2024 at 7:08 PM     Finalized by (CST): Leonard Hughes MD on 8/14/2024 at 7:10 PM           Assessment/Plan:    Acute hypoxic respiratory failure 2/2 possible airway obstruction s/p surgery, possible mass on uvula  -SBT  -CXR and CT neck  -Check cuff leak prior to extubation  -ENT following, to be at bedside during extubation    S/P TURP  -CBI  -post-op orders per urology    BPH  -continue flomax    F/E/N  -NPO  -IVF  -replete electrolytes as needed    Proph  -SCD  -SQ Lovenox    Dispo  -full code  -ICU for risk of deterioration    Plan of care discussed with intensivist on-call, Dr. Allen.    Jeannie Pickett, [facility]      Pulmonary/Critical Care Physician Addendum     Manolo Lesko was interviewed and examined personally.  I reviewed and agree with the APN's documentation above of the patient's history, physical examination, test results, diagnoses, and plan of treatment.      Labs and  imaging reviewed.     ASSESSMENT/PLAN  Admitted after TURP yesterday and needed to be reintubated. Possible mass?  CT neck this am w/o obvious mass seen.   ENT asked to be at bedside and pt was successfully extubated after passing SBT.    Critical care time 35 min.      Thank you for the opportunity to care for Troy Vidal.      KARL Allen DO, MPH  Pulmonary Critical Care Medicine  [facility]

## 2024-08-16 VITALS
SYSTOLIC BLOOD PRESSURE: 148 MMHG | HEART RATE: 78 BPM | WEIGHT: 209.44 LBS | TEMPERATURE: 99 F | HEIGHT: 67 IN | BODY MASS INDEX: 32.87 KG/M2 | OXYGEN SATURATION: 97 % | RESPIRATION RATE: 25 BRPM | DIASTOLIC BLOOD PRESSURE: 70 MMHG

## 2024-08-16 LAB
ANION GAP SERPL CALC-SCNC: 5 MMOL/L (ref 0–18)
BASOPHILS # BLD AUTO: 0.02 X10(3) UL (ref 0–0.2)
BASOPHILS NFR BLD AUTO: 0.2 %
BUN BLD-MCNC: 15 MG/DL (ref 9–23)
CALCIUM BLD-MCNC: 8.7 MG/DL (ref 8.7–10.4)
CHLORIDE SERPL-SCNC: 108 MMOL/L (ref 98–112)
CO2 SERPL-SCNC: 27 MMOL/L (ref 21–32)
CREAT BLD-MCNC: 0.85 MG/DL
CREAT FLD-MCNC: 0.9 MG/DL
EGFRCR SERPLBLD CKD-EPI 2021: 95 ML/MIN/1.73M2 (ref 60–?)
EOSINOPHIL # BLD AUTO: 0.03 X10(3) UL (ref 0–0.7)
EOSINOPHIL NFR BLD AUTO: 0.2 %
ERYTHROCYTE [DISTWIDTH] IN BLOOD BY AUTOMATED COUNT: 12.6 %
ERYTHROCYTE [DISTWIDTH] IN BLOOD BY AUTOMATED COUNT: 12.6 %
GLUCOSE BLD-MCNC: 111 MG/DL (ref 70–99)
HCT VFR BLD AUTO: 35.4 %
HCT VFR BLD AUTO: 35.4 %
HGB BLD-MCNC: 12 G/DL
HGB BLD-MCNC: 12 G/DL
IMM GRANULOCYTES # BLD AUTO: 0.05 X10(3) UL (ref 0–1)
IMM GRANULOCYTES NFR BLD: 0.4 %
LYMPHOCYTES # BLD AUTO: 1.56 X10(3) UL (ref 1–4)
LYMPHOCYTES NFR BLD AUTO: 11.8 %
MAGNESIUM SERPL-MCNC: 2.2 MG/DL (ref 1.6–2.6)
MCH RBC QN AUTO: 30.5 PG (ref 26–34)
MCH RBC QN AUTO: 30.5 PG (ref 26–34)
MCHC RBC AUTO-ENTMCNC: 33.9 G/DL (ref 31–37)
MCHC RBC AUTO-ENTMCNC: 33.9 G/DL (ref 31–37)
MCV RBC AUTO: 90.1 FL
MCV RBC AUTO: 90.1 FL
MONOCYTES # BLD AUTO: 1 X10(3) UL (ref 0.1–1)
MONOCYTES NFR BLD AUTO: 7.6 %
NEUTROPHILS # BLD AUTO: 10.58 X10 (3) UL (ref 1.5–7.7)
NEUTROPHILS # BLD AUTO: 10.58 X10(3) UL (ref 1.5–7.7)
NEUTROPHILS NFR BLD AUTO: 79.8 %
OSMOLALITY SERPL CALC.SUM OF ELEC: 292 MOSM/KG (ref 275–295)
PLATELET # BLD AUTO: 156 10(3)UL (ref 150–450)
PLATELET # BLD AUTO: 156 10(3)UL (ref 150–450)
POTASSIUM SERPL-SCNC: 3.9 MMOL/L (ref 3.5–5.1)
RBC # BLD AUTO: 3.93 X10(6)UL
RBC # BLD AUTO: 3.93 X10(6)UL
SODIUM SERPL-SCNC: 140 MMOL/L (ref 136–145)
WBC # BLD AUTO: 13.2 X10(3) UL (ref 4–11)
WBC # BLD AUTO: 13.2 X10(3) UL (ref 4–11)

## 2024-08-16 PROCEDURE — 99232 SBSQ HOSP IP/OBS MODERATE 35: CPT | Performed by: INTERNAL MEDICINE

## 2024-08-16 RX ORDER — HYDROCODONE BITARTRATE AND ACETAMINOPHEN 5; 325 MG/1; MG/1
1 TABLET ORAL EVERY 6 HOURS PRN
Qty: 10 TABLET | Refills: 0 | Status: SHIPPED | OUTPATIENT
Start: 2024-08-16

## 2024-08-16 RX ORDER — SULFAMETHOXAZOLE/TRIMETHOPRIM 800-160 MG
1 TABLET ORAL 2 TIMES DAILY
Qty: 2 TABLET | Refills: 0 | Status: SHIPPED | OUTPATIENT
Start: 2024-08-16 | End: 2024-08-17

## 2024-08-16 RX ORDER — PSEUDOEPHEDRINE HCL 30 MG
100 TABLET ORAL 2 TIMES DAILY PRN
Qty: 30 CAPSULE | Refills: 0 | Status: SHIPPED | OUTPATIENT
Start: 2024-08-16

## 2024-08-16 NOTE — PAYOR COMM NOTE
--------------  CONTINUED STAY REVIEW----CLINICALS FOR 8/16---PLEASE FAX BACK APPROVAL      Payor: [employer]  Subscriber #:  [id number]  Authorization Number: [id number]    Admit date: 8/14/24  Admit time:  3:59 PM    REVIEW DOCUMENTATION:  Critical Care Progress Note              Subjective:  No overnight events.      Objective:     Medications:   tamsulosin  0.4 mg Oral Daily    enoxaparin  40 mg Subcutaneous Daily    sennosides  17.2 mg Oral Nightly    docusate sodium  100 mg Oral BID                  Intake/Output Summary (Last 24 hours) at 8/16/2024 0750  Last data filed at 8/16/2024 0600      Gross per 24 hour   Intake 1781.85 ml   Output 41434 ml   Net -89343.15 ml         /61   Pulse 69   Temp 98.3 °F (36.8 °C) (Temporal)   Resp 15   Ht 170.2 cm (5' 7\")   Wt 209 lb 7 oz (95 kg)   SpO2 94%   BMI 32.80 kg/m²      Physical Exam:               General Appearance: Alert, cooperative, no distress, appears stated age  Neck: No JVD, neck supple, no adenopathy, trachea midline  Lungs: Clear to auscultation bilaterally, respirations unlabored  Heart: Regular rate and rhythm, S1 and S2 normal, no murmur, rub or gallop  Abdomen: Soft, round, tender, bowel sounds active all four quadrants, no masses, no organomegaly  Extremities: Extremities normal, atraumatic, no cyanosis or edema,capillary refill <3 sec.    Pulses: 2+ and symmetric all extremities  Skin: Skin color, texture, turgor normal for ethnicity, no rashes or lesions, warm and dry. Lap sites clean/dry/intact  Neurologic: Normal strength         Recent Labs   Lab 08/16/24  0304   RBC 3.93  3.93   HGB 12.0*  12.0*   HCT 35.4*  35.4*   MCV 90.1  90.1   MCH 30.5  30.5   MCHC 33.9  33.9   RDW 12.6  12.6   NEPRELIM 10.58*   WBC 13.2*  13.2*   .0  156.0            Recent Labs   Lab 08/14/24  1756 08/15/24  0453 08/16/24  0304   *  202* 142* 111*   BUN 9  9 13 15   CREATSERUM 1.00  1.00 0.90 0.85   CA 8.5*  8.5*  8.7 8.7   ALB 3.4  --   --      137 138 140   K 3.6  3.6 4.0 3.9     106 107 108   CO2 21.0  21.0 23.0 27.0   ALKPHO 55  --   --    AST 24  --   --    ALT 20  --   --    BILT 0.7  --   --    TP 5.7  --   --           Recent Labs   Lab 08/14/24  1918   ABGPHT 7.40   [id number] 37   EIWEK8N 346*   ABGHCO3 23.8   ABGBE -1.5   TEMP 98.6   HIGINIO Positive   SITE Left Radial   DEV  adult   THGB 13.3      No results for input(s): \"BNP\" in the last 168 hours.  No results for input(s): \"TROP\", \"CK\" in the last 168 hours.     CT SOFT TISSUE OF NECK(CONTRAST ONLY) (CPT=70491)     Result Date: 8/15/2024  CONCLUSION:  Endotracheal tube tip in the approximate mid trachea.  Prominent adenoid soft tissues and left greater than right tonsillar tissues.  May reflect inflammatory changes.  After the patient has been extubated, consider direct visualization to exclude masses.  No abscess, gas collection, pneumothorax, pneumomediastinum, cyst or other fluid collection.   LOCATION:  [facility]    Dictated by (CST): Alex Severino MD on 8/15/2024 at 9:44 AM     Finalized by (CST): Alex Severino MD on 8/15/2024 at 9:48 AM        XR CHEST AP PORTABLE  (CPT=71045)     Result Date: 8/15/2024  CONCLUSION:  New band of subsegmental atelectasis in the right mid to lower lung.  Other findings appear stable.   LOCATION:  [facility]      Dictated by (CST): Ramiro Marx MD on 8/15/2024 at 8:27 AM     Finalized by (CST): Ramiro Marx MD on 8/15/2024 at 8:29 AM             Assessment/Plan:     Acute hypoxic respiratory failure 2/2 possible airway obstruction s/p surgery, possible mass on uvula  -on room air, nasal cannula with sleep  -ANIBAL workup as outpatient  -CXR and CT neck review  -ENT follow up outpatient     S/P TURP  -CBI  -post-op orders per urology     BPH  -continue flomax     F/E/N  -general  -replete electrolytes as needed     Proph  -SCD  -SQ Lovenox     Dispo  -full code  -may transfer to floor     Plan of care discussed with  intensivist on-call, Dr. Jessee Jiang, [facility]  Critical Care NP        MEDICATIONS ADMINISTERED IN LAST 1 DAY:  docusate sodium (Colace) cap 100 mg       Date Action Dose Route User    8/16/2024 0825 Given 100 mg Oral Eleazar John RN    8/15/2024 2051 Given 100 mg Oral Jerilyn Valle RN          enoxaparin (Lovenox) 40 MG/0.4ML SUBQ injection 40 mg       Date Action Dose Route User    8/16/2024 0825 Given 40 mg Subcutaneous (Left Lower Abdomen) Eleazar John RN          lactated ringers infusion       Date Action Dose Route User    8/16/2024 0443 New Bag (none) Intravenous Jerilyn Valle RN    8/15/2024 2052 New Bag (none) Intravenous Jerilyn Valle RN          oxyCODONE immediate release tab 5 mg       Date Action Dose Route User    8/16/2024 0441 Given 5 mg Oral Jerilyn Valle RN    8/15/2024 2051 Given 5 mg Oral Jerilyn Valle RN          sennosides (Senokot) tab 17.2 mg       Date Action Dose Route User    8/15/2024 2051 Given 17.2 mg Oral Jerilyn Valle RN          sodium chloride 0.9 % irrigation 3,000 mL       Date Action Dose Route User    8/16/2024 0000 New Bag 3,000 mL Irrigation Jerilyn Valle RN          tamsulosin (Flomax) cap 0.4 mg       Date Action Dose Route User    8/16/2024 0824 Given 0.4 mg Oral Eleazar John RN            Vitals (last day)       Date/Time Temp Pulse Resp BP SpO2 Weight O2 Device O2 Flow Rate (L/min) [facility]    08/16/24 0800 98.8 °F (37.1 °C) 70 16 154/76 95 % -- -- -- MS    08/16/24 0700 -- 69 15 134/61 94 % -- -- -- SP    08/16/24 0648 -- 65 16 134/61 94 % -- None (Room air) -- SP    08/16/24 0600 -- 63 20 141/63 94 % -- -- -- SP    08/16/24 0532 -- 59 17 -- -- -- -- --     08/16/24 0532 -- -- -- -- 88 % -- Nasal cannula 2 L/min     08/16/24 0527 -- -- -- -- 85 % -- -- --     08/16/24 0524 -- -- -- -- 89 % -- -- --     08/16/24 0523 -- -- -- -- 88 % -- -- --     08/16/24 0522 -- -- -- -- 88  % -- -- -- EK    08/16/24 0519 -- -- -- -- 89 % -- -- -- EK    08/16/24 0512 -- -- -- -- 89 % -- -- -- EK    08/16/24 0500 -- 64 18 150/68 96 % -- -- -- SP    08/16/24 0400 98.3 °F (36.8 °C) 63 19 150/67 95 % -- -- -- SP    08/16/24 0314 -- 64 11 142/67 94 % -- -- -- SP    08/16/24 0300 -- 67 22 142/67 91 % -- -- -- SP    08/16/24 0200 -- 72 21 142/68 94 % -- -- -- SP    08/16/24 0100 -- 73 19 138/60 93 % -- -- -- SP    08/16/24 0000 99.2 °F (37.3 °C) 75 21 141/68 95 % -- None (Room air) -- SP    08/15/24 2300 -- 79 12 138/58 94 % -- -- -- SP    08/15/24 2200 -- 82 23 145/54 93 % -- -- -- SP    08/15/24 2100 -- 86 25 158/57 94 % -- -- -- SP    08/15/24 2000 100.1 °F (37.8 °C) 80 21 144/61 95 % -- None (Room air) -- SP    08/15/24 1900 -- 82 20 141/63 94 % -- -- -- SP    08/15/24 1800 -- 83 23 144/58 95 % -- -- -- MS    08/15/24 1700 -- 78 17 149/68 96 % -- -- -- MS    08/15/24 1600 99.5 °F (37.5 °C) 80 19 146/71 95 % -- -- -- MS    08/15/24 1500 -- 78 16 140/64 95 % -- None (Room air) -- MS    08/15/24 1400 99.6 °F (37.6 °C) 78 17 106/84 97 % -- -- -- MS    08/15/24 1300 -- 79 18 157/70 98 % -- -- -- MS    08/15/24 1200 98 °F (36.7 °C) 85 21 157/71 97 % -- -- -- MS    08/15/24 1155 -- -- -- -- 97 % -- Nasal cannula 2 L/min AD    08/15/24 1155 -- 86 17 157/71 -- -- -- -- MS    08/15/24 1100 -- 69 14 136/69 98 % -- -- -- MS    08/15/24 1000 -- 80 16 141/68 98 % -- -- -- MS    08/15/24 0924 -- 90 16 -- 100 % -- -- -- AD    08/15/24 0900 -- 84 15 157/78 100 % -- -- -- MS    08/15/24 0800 98.5 °F (36.9 °C) 71 12 142/62 100 % -- Ventilator -- MS    08/15/24 0746 -- 71 14 -- 100 % -- -- -- AD    08/15/24 0700 -- 67 14 123/65 100 % -- -- -- ZS    08/15/24 0600 -- 62 14 98/55 99 % -- -- -- ZS    08/15/24 0500 -- 68 15 126/68 100 % -- -- -- ZS    08/15/24 0400 97.9 °F (36.6 °C) 60 14 101/55 99 % -- Ventilator -- ZS    08/15/24 0300 -- 63 15 108/55 100 % -- -- -- ZS    08/15/24 0200 -- 56 14 112/67 100 % -- -- -- ZS     08/15/24 0100 -- 67 14 127/68 100 % -- -- -- ZS    08/15/24 0000 97.9 °F (36.6 °C) 66 15 122/71 100 % -- Ventilator -- ZS          CIWA Scores (since admission)       None

## 2024-08-16 NOTE — DISCHARGE INSTRUCTIONS
[facility] AND [facility] GENERAL UROLOGY POST OPERATIVE INSTRUCTIONS    The time after surgery is one that can be interesting, scary, and full of questions.  Here are some general items to help you navigate the post-operative period.  At any time, should you have any questions, don't hesitate to contact our office for additional assistance.    DIET:  Unless otherwise directed, resume your regular healthy diet.  Return to any medically-directed diets if necessary (renal diet, diabetic diet, etc.).  Drink plenty of fluids.  Most fluids are all right, including small amounts of alcoholic beverages if desired (but not recommended if you are taking prescription pain medication or other medications that you may not use with alcohol ingestion.)    ACTIVITY:  Avoid strenuous physical exercise, including heavy lifting/pushing/pulling (>20 lbs.) and sexual intercourse until released by your doctor.  This is generally a period of four to eight weeks for open or laparoscopic surgical procedures, or 1-2 weeks for outpatient procedures.  Driving is generally okay once pain free and off all prescription pain medications; of course, you may be a passenger for short rides.  Going out to the library, to supper and a movie, or other light activity is even encouraged if you feel well.  Extended travel is not recommended until you are released by your surgeon.  It is okay to go up and down stairs as long as you go slowly.  If you have any surgical clips or sutures, you may be allowed to take routine showers, but should not submerge your incisions in standing water (pool, tub, etc.) for 21 days.  Avoid rubbing or scrubbing the incision(s).  Rather, allow soapy water to pass over the incisions and simply pat them dry.    VOIDING:  Many urology patients will be discharged with a catheter and will need additional instructions (see below); however, for patients without a catheter, please void whenever the urge presents itself.  Do not hold  your urine.  You may be getting up in the middle of the night or urinating more frequently during the daytime after any urologic procedure.  This is normal after many types of surgeries, but a more normal urinary voiding pattern should resume within days, or rarely within a few weeks.  If you are unable to urinate altogether, please phone our nurse for further instructions, or seek attention in an immediate/urgent/or emergent setting.      BLOOD IN THE URINE:  You may have some blood in the urine for two to four weeks after some urologic procedures.  This is usually not serious and a normal part of healing from some urinary surgeries.  Should you have persistent blood, large clots, or the inability to void due to large clots, notify you urology team.    REST:  You should get plenty of rest after any procedure.  However, use your bed as you did prior to your surgery - to take a small nap, and to sleep in the evenings.  Do not lie around in bed all day as this can actually result in post anesthesia complications.  We encourage you to get out of bed daily, take multiple light walks, strolling outdoors if desired.  It is well known that patients that lie or sit all day have more wound complications, at times sever complications from blood clots in the legs, pneumonias, and other challenges.    CONSTIPATION:  Please work to avoid constipation.  We do not recommend enemas or most rectal suppositories after most urologic surgery.  Daily use of Colace or Docusate over-the-counter (100 mg three times daily with 8 oz water) is recommended for the month after surgery - especially if taking prescription pain medication.  Daily prune juice and increased intake of fruits and vegetables are also helpful to prevent acute constipation.  Acute constipation may necessitate the use of over-the-counter Milk of Magnesia - 30 cc every evening until effect - if needed.    MEDICATIONS:  Unless otherwise directed, resume all of your prior  home medications upon discharge.  The exception may be blood thinners (Coumadin, Warfarin, Plavix, Xeralto, etc.) which are typically held for one week prior to, and after, larger urologic surgeries.  Your surgeon will give the recommended times for these medications to be held so that you may work with the nurse or physician tem that manages your anticoagulation medication.  Should you be prescribed and antibiotic please take as directed until completed.      INCENTIVE SPIROMETER/DEEP BREATHING EXERCISES:  You may be provided with an incentive spirometer (IS) breathing exercise machine while in the hospital.  Your nursing staff will educate you on it's use.  This is a very important piece to post anesthesia pneumonia prevention, so take your IS home with you and continue regular use (10x/hour while awake) for the entire time you recover at home prior rot returning to work or regular activities.    FOLLOW UP CARE:  Please call our office at [phone] to coordinate follow up     START BACTRIM THE MORNING OF ROSALES CATHETER REMOVAL    NOTIFY OUR OFFICE OF:  - Temperature greater than 101 degrees.  - Any questions you think are important for your urology team.  - You have challenges with catheter management.    SEEK EMERGENCY CARE with sudden onset of shortness of breath, chest pain, increasing calf or leg pain, or acute condition that you feel needs emergent attention.    --------------  Caring for Your Catheter    A rosales catheter has been placed into your bladder to drain your urine.  A small balloon in the catheter is inflated and keeps the catheter in your bladder.  Proper care of your catheter and the drainage bag can help to prevent infections.    Care of the Catheter:  The catheter should be securely taped to your thigh or abdomen to prevent pulling or increased tension on the urethra.  At least once daily, gently wash the catheter starting where the catheter enters your body and cleanse down the entire  length of the catheter. Use a mild soap.  Do not use any astringents or antibacterial soap. It's best done in the shower. Make sure that any encrustations on the catheter are washed away.  Rinse well.   If you are an uncircumcised male, push the foreskin back to clean and after cleaning the catheter push the foreskin back to its normal position  Care of the Drainage Bag       Empty the drainage bag when it is ½ to 1/3 full.  The drainage bag must always be to gravity drainage and must be below the level of your bladder.  The bag should never be left lying on the floor.  Drainage bags should be cleaned each time you switch from a leg bag to a night drainage bag.  If you do not switch from a leg bag to a night drainage system, you should wash your drainage bag 1 x/week.  After disconnecting the tubing from the catheter, pour a solution of 1 part bleach to 10 parts water through the tubing and the bag. Rinse the inside and the outside of the drainage bag with water to remove all the bleach solution so that no injury to your skin will develop.  Empty the drainage bag when it is ½ to 1/3 full.  The drainage bag must always have gravity drainage and must be below the level of your bladder.  What To Do If You Leak Around the Catheter:  Check the connections between the catheter and the drainage system to make sure they are intact and not the source of the leak.  Make sure the catheter is securely taped and holding the catheter securely and not pulling.  Readjust as necessary.  Make sure that the catheter is gravity drainage.  Call the office. You may be experiencing bladder spasms. You may be asked to come in and have the catheter checked or you may be given a medication that stops the spasms.  Special Instructions:  Increase your fluid intake so that your urine output is between 1500 and 2000cc’s/day.  Prevent constipation.  Increase daily fiber or use Miralax as needed.  Contact Our Office If:  If you see excessive blood  or clots in your urine  If you have a temperature that is greater than 101.  If you don’t see any urine in your drainage bag after 3-4 hours.  Check the position of the drainage bag first and also make sure the catheter isn’t kinked.  If you have any burning, pain, purulent drainage or bleeding from around the  catheter site.  If you have persistent leaking around the catheter.

## 2024-08-16 NOTE — PLAN OF CARE
Report received from previous RN, Pt A&O  4. Pt c/o abd pain and tenderness. Medicated as needed.. Lungs DM  2 l at night time. IS 2000  while awake.  CIB with pink color urine. CRISTIANA bloody.

## 2024-08-16 NOTE — PROGRESS NOTES
[facility]  Hospitalist Progress Note                                                                     [facility]   part of [facility]        Troy Vidal  1/26/1955    SUBJECTIVE:  Patient seen and examined.  Feeling well.  Ambulating.  Not passing much flatus.  Denies CP/SOB.  NAD.       OBJECTIVE:  Temp:  [98.3 °F (36.8 °C)-100.1 °F (37.8 °C)] 98.8 °F (37.1 °C)  Pulse:  [59-86] 70  Resp:  [11-25] 16  BP: (106-158)/(54-84) 154/76  SpO2:  [85 %-98 %] 95 %  Exam  Gen: No acute distress, alert and oriented x3, no focal neurologic deficits  Pulm: Lungs clear bilaterally, normal respiratory effort  CV: Heart with regular rate and rhythm, no murmur.  Normal PMI.    Abd: Abdomen soft, nontender, nondistended, no organomegaly, bowel sounds present  MSK: Full range of motion in extremities, no clubbing, no cyanosis  Skin: no rashes or lesions    Labs:   Recent Labs   Lab 08/14/24  1756 08/15/24  0453 08/16/24  0304   WBC 14.4* 13.9* 13.2*  13.2*   HGB 14.1 13.0 12.0*  12.0*   MCV 94.4 91.1 90.1  90.1   .0* 158.0 156.0  156.0       Recent Labs   Lab 08/14/24  1756 08/15/24  0453 08/16/24  0304     137 138 140   K 3.6  3.6 4.0 3.9     106 107 108   CO2 21.0  21.0 23.0 27.0   BUN 9  9 13 15   CREATSERUM 1.00  1.00 0.90 0.85   CA 8.5*  8.5* 8.7 8.7   MG  --  1.6 2.2   *  202* 142* 111*       Recent Labs   Lab 08/14/24 1756   ALT 20   AST 24   ALB 3.4       No results for input(s): \"PGLU\" in the last 168 hours.    Meds:   Scheduled:    tamsulosin  0.4 mg Oral Daily    enoxaparin  40 mg Subcutaneous Daily    sennosides  17.2 mg Oral Nightly    docusate sodium  100 mg Oral BID     Continuous Infusions:    sodium chloride       PRN:   meperidine    polyethylene glycol (PEG 3350)    ondansetron    metoclopramide    oxyCODONE **OR** oxyCODONE    HYDROmorphone **OR** HYDROmorphone    melatonin    bacitracin    oxybutynin     Faxed updated orders and progress notes to [facility]   benzocaine-menthol    acetaminophen **OR** acetaminophen **OR** acetaminophen **OR** acetaminophen    senna    bisacodyl    fleet enema    Assessment/Plan:  Active Problems:    Benign localized hyperplasia of prostate with urinary obstruction and lower urinary tract symptoms    69 year old male with past medical history of hyperlipidemia, BPH is being seen for perioperative medical management after undergoing a scheduled TURP      S/p TURP, [place] 8/14   - PO/IV meds for pain control per surgery, wean to oral meds as able  - Adv diet as tolerated per surgery, zofran prn for nausea, OBR  - PT/OT/SW  - SCD for DVT prophy  - rosales management per urology, cont rosales on dc, OP VT     Acute hypoxic resp failure 2/2 airway obstruction s/p surgery  - s/p reintubation 8/14 and extubation 8/15 AM  - taper O2 as tolerated  - CT imaging reviewed  - ENT/ICU following     BPH  - flomax       Medically stable       Nu Vu MD  [facility] Hospitalist  Message over [employer]  Pager: [phone]

## 2024-08-16 NOTE — PLAN OF CARE
Pt stable throughout shift. Critical care s/o early in shift. Waiting for Urology ok to discharge. Urology okay'd discharge with consults. Pt lines removed. discharge paperwork given and explained to pt. Education on catheter care and return demo performed. Pt sent with supplies. No complaints at time of discharge.  Pt left via wheelchair to personal vehicle at 1727.

## 2024-08-16 NOTE — OCCUPATIONAL THERAPY NOTE
OT orders received and patient chart reviewed.     Per discussion with patient, he is up ad yohana walking the hallways. He has a walk in shower at home and feels confident in ability to complete bathing given previous experience managing catheter at home. Pt denies any acute IP OT concerns and declines OT during this stay.     Please re-order should pt experience decline in functional status.     OT will sign off at this time.

## 2024-08-16 NOTE — PHYSICAL THERAPY NOTE
Attempted to see pt for PT session this afternoon. Rn approved session and confirmed that pt has already ambulated twice on unit with staff.   Patient politely declined any further PT needs. He confirmed that he doesn't have any difficulty ambulating and able to stand and mobilize without any device and assist. Patient was witnessed standing during conversation with MD prior to entering the room. PT will sign off. Please re consult if any concerns arise.

## 2024-08-16 NOTE — PROGRESS NOTES
Troy Vidal Patient Status:  Outpatient in a Bed    1955 MRN [id number]   [facility] 4SW-A Attending Ben Ramires MD   Hosp Day # 2 PCP TROY OJEDA     Critical Care Progress Note          Subjective:  No overnight events.     Objective:    Medications:   tamsulosin  0.4 mg Oral Daily    enoxaparin  40 mg Subcutaneous Daily    sennosides  17.2 mg Oral Nightly    docusate sodium  100 mg Oral BID             Intake/Output Summary (Last 24 hours) at 2024 0750  Last data filed at 2024 0600  Gross per 24 hour   Intake 1781.85 ml   Output 13642 ml   Net -16359.15 ml       /61   Pulse 69   Temp 98.3 °F (36.8 °C) (Temporal)   Resp 15   Ht 170.2 cm (5' 7\")   Wt 209 lb 7 oz (95 kg)   SpO2 94%   BMI 32.80 kg/m²     Physical Exam:   General Appearance: Alert, cooperative, no distress, appears stated age  Neck: No JVD, neck supple, no adenopathy, trachea midline  Lungs: Clear to auscultation bilaterally, respirations unlabored  Heart: Regular rate and rhythm, S1 and S2 normal, no murmur, rub or gallop  Abdomen: Soft, round, tender, bowel sounds active all four quadrants, no masses, no organomegaly  Extremities: Extremities normal, atraumatic, no cyanosis or edema,capillary refill <3 sec.    Pulses: 2+ and symmetric all extremities  Skin: Skin color, texture, turgor normal for ethnicity, no rashes or lesions, warm and dry. Lap sites clean/dry/intact  Neurologic: Normal strength    Recent Labs   Lab 24  0304   RBC 3.93  3.93   HGB 12.0*  12.0*   HCT 35.4*  35.4*   MCV 90.1  90.1   MCH 30.5  30.5   MCHC 33.9  33.9   RDW 12.6  12.6   NEPRELIM 10.58*   WBC 13.2*  13.2*   .0  156.0     Recent Labs   Lab 24  1756 08/15/24  0453 24  0304   *  202* 142* 111*   BUN 9  9 13 15   CREATSERUM 1.00  1.00 0.90 0.85   CA 8.5*  8.5* 8.7 8.7   ALB 3.4  --   --      137 138 140   K 3.6  3.6 4.0 3.9     106 107 108   CO2 21.0  21.0  23.0 27.0   ALKPHO 55  --   --    AST 24  --   --    ALT 20  --   --    BILT 0.7  --   --    TP 5.7  --   --      Recent Labs   Lab 08/14/24  1918   ABGPHT 7.40   KZTGWP2U 37   MABWR6P 346*   ABGHCO3 23.8   ABGBE -1.5   TEMP 98.6   HIGINIO Positive   SITE Left Radial   DEV  adult   THGB 13.3     No results for input(s): \"BNP\" in the last 168 hours.  No results for input(s): \"TROP\", \"CK\" in the last 168 hours.    CT SOFT TISSUE OF NECK(CONTRAST ONLY) (CPT=70491)    Result Date: 8/15/2024  CONCLUSION:  Endotracheal tube tip in the approximate mid trachea.  Prominent adenoid soft tissues and left greater than right tonsillar tissues.  May reflect inflammatory changes.  After the patient has been extubated, consider direct visualization to exclude masses.  No abscess, gas collection, pneumothorax, pneumomediastinum, cyst or other fluid collection.   LOCATION:  [facility]    Dictated by (CST): Alex Severino MD on 8/15/2024 at 9:44 AM     Finalized by (CST): Alex Severino MD on 8/15/2024 at 9:48 AM       XR CHEST AP PORTABLE  (CPT=71045)    Result Date: 8/15/2024  CONCLUSION:  New band of subsegmental atelectasis in the right mid to lower lung.  Other findings appear stable.   LOCATION:  [facility]      Dictated by (CST): Ramiro Marx MD on 8/15/2024 at 8:27 AM     Finalized by (CST): Ramiro Marx MD on 8/15/2024 at 8:29 AM           Assessment/Plan:    Acute hypoxic respiratory failure 2/2 possible airway obstruction s/p surgery, possible mass on uvula  -on room air, nasal cannula with sleep  -ANIBAL workup as outpatient  -CXR and CT neck review  -ENT follow up outpatient    S/P TURP  -CBI  -post-op orders per urology    BPH  -continue flomax    F/E/N  -general  -replete electrolytes as needed    Proph  -SCD  -SQ Lovenox    Dispo  -full code  -may transfer to floor    Plan of care discussed with intensivist on-call, Dr. Jessee Jiang, [facility]  Critical Care NP  Phone [phone]      Intensivist addendum:   APN note  reviewed. I have independently seen and evaluated the patient. ENT has seen for possible mass and recommended outpatient follow-up. Needs evaluation for possible ANIBAL- discussed with patient. S/p recent TURP- management per urology. Okay to transfer to the floor. Will follow while ICU status.     Ellis Hooks MD  Pulmonary and Critical Care Medicine

## 2024-08-16 NOTE — PROGRESS NOTES
[facility]  Urology Progress Note    Troy Vidal Patient Status:  Inpatient    1955 MRN [id number]   [facility] 4SW-A Attending Ben Ramires MD   Hosp Day # 2 PCP TROY OJEDA     Subjective:  Troy Vidal is a(n) 69 year old male.    S/P robotic-assisted laparoscopic simple prostatectomy 24 with Dr. Ramires.     Current complaints: +abdominal discomfort, bloating.  No nausea, vomiting, fever, or chills. Tolerating diet. Passed a little flatus.  No CP, SOB, or calf pain.  Up in chair.      Objective:  General appearance: alert, appears stated age, and cooperative  Blood pressure 154/76, pulse 70, temperature 98.8 °F (37.1 °C), temperature source Temporal, resp. rate 16, height 5' 7\" (1.702 m), weight 209 lb 7 oz (95 kg), SpO2 95%.  Lungs: non-labored respirations  Abdomen:  soft, mildly distended with expected incisional tenderness. Incisions C/D/I.    Drain in place with SS fluid (output - 120 mL/24 hours)  : rosales catheter in place draining blood tinged urine with CBI.      Lab Results   Component Value Date    WBC 13.2 2024    WBC 13.2 2024    HGB 12.0 2024    HGB 12.0 2024    HCT 35.4 2024    HCT 35.4 2024    .0 2024    .0 2024    CREATSERUM 0.85 2024    BUN 15 2024     2024    K 3.9 2024     2024    CO2 27.0 2024     2024    CA 8.7 2024    MG 2.2 2024        CT SOFT TISSUE OF NECK(CONTRAST ONLY) (CPT=70491)    Result Date: 8/15/2024  CONCLUSION:  Endotracheal tube tip in the approximate mid trachea.  Prominent adenoid soft tissues and left greater than right tonsillar tissues.  May reflect inflammatory changes.  After the patient has been extubated, consider direct visualization to exclude masses.  No abscess, gas collection, pneumothorax, pneumomediastinum, cyst or other fluid collection.   LOCATION:  [facility]    Dictated by (CST):  Alex Severino MD on 8/15/2024 at 9:44 AM     Finalized by (CST): Alex Severino MD on 8/15/2024 at 9:48 AM       XR CHEST AP PORTABLE  (CPT=71045)    Result Date: 8/15/2024  CONCLUSION:  New band of subsegmental atelectasis in the right mid to lower lung.  Other findings appear stable.   LOCATION:  [facility]      Dictated by (CST): Ramiro Marx MD on 8/15/2024 at 8:27 AM     Finalized by (CST): Ramiro Marx MD on 8/15/2024 at 8:29 AM       XR CHEST AP PORTABLE  (CPT=71045)    Result Date: 8/14/2024  CONCLUSION:  See above   LOCATION:  [facility]      Dictated by (CST): Leonard Hughes MD on 8/14/2024 at 7:08 PM     Finalized by (CST): Leonard Hughes MD on 8/14/2024 at 7:10 PM         Assessment:    BPH WITH LUTS, URINARY RETENTION  S/P robotic-assisted laparoscopic simple prostatectomy 8/14/24 with Dr. Ramires  Afebrile, VSS  WBC 14.4 > 13.9 > 13.2  Hgb 14.1 > 13 > 12  Platelet count 143 > 158 > 156  Serum creat 1 > 0.9 > 0.85     AIRWAY OBSTRUCTION S/P SURGERY, POSSIBLE MASS ON UVULA  -extubated 8/15/24  -ENT consulted - CT reviewed and no significant mass seen.      Plan:    Encouraged ambulation and use of IS x 10/hr  Pain management   Bowel regimen  CBI clamped.  Rosales catheter hand irrigated without difficulty. Monitor color/clarity of urine.    Follow labs, temp,, UOP, drain output  Check CRISTIANA drain creat  CPM with rosales catheter. Voiding trial as outpatient as scheduled.  Start bactrim the AM of rosales catheter removal.    Hospitalist following  Dr. Ramires to see patient later today.      Above discussed with patient, nurse  Will update Dr. Keyla Macias PA-C  [facility]  Department of Urology  8/16/2024  9:06 AM    Addendum:  Drain creat 0.9  CRISTIANA drain can be removed.      CHRISTOPHE Godoy  Urology

## 2024-08-16 NOTE — CDS QUERY
CLINICAL DOCUMENTATION CLARIFICATION     Dear Dr. Vu,     Please clarify the need for post-operative mechanical ventilation following patient's TURP.     [   ] Mechanical ventilation post-operatively for airway protection and without respiratory failure     [   ] Mechanical ventilation post-operatively for respiratory failure due to surgery    [   ] Mechanical ventilation post-operatively for respiratory failure due to other - please specify:    [   ] Other (Please specify):        CLINICAL INFORMATION FROM THE MEDICAL RECORD    Risk Factors/Clinical Indicators:   ___ 69M admitted for surgical procedure - TURP  ___ 08/14/24 Anesthesia post-procedure note: After meeting all extubation criteria, pt with airway obstruction following extubation. Unable to ventilate without jaw thrust despite being fully awake, strong, and following commands. Decision ultimately made to reintubate for airway protection until further evaluation can be performed.  ___08/15/2024 Intra-procedure Flowsheet Data:   1701 1 BPM, tidal volume 25 ml, SpO2 97%   1705 4 BPM, tidal volume 156 ml, SpO2 97%   1710 2 BPM, tidal volume - not listed, SpO2 94%   1715 1 BPM, tidal volume - 102 ml, SpO2 97%   1716 1 BPM, tidal volume - 102 ml, SpO2 87%  ___ 08/14/2024 CXR: Mild left basilar airspace linear opacity, which may be related to atelectasis though superimposed infection/inflammation and/or aspiration is not excluded, correlate clinically.   ___ 08/15/2024 ENT consult: Upper airway obstruction. CT reviewed and I don't see any significant mass. At bedside for extubation and patient doing well.  ___ 08/15/2024 Hospitalist PN: Acute hypoxic resp failure due to airway obstruction s/p surgery, s/p reintubation 08/14 and extubation 08/15    Treatment: Xray and CT imaging, ENT consult, admission to CCU      Use of terms such as suspected, possible, or probable (associated with a specific diagnosis that is being evaluated, monitored, or treated as if  it exists) are acceptable and can be coded in the inpatient setting, when documented at the time of discharge.    Please add any additional documentation to your progress note and continue to document this through discharge. For questions regarding this query, please contact Clinical : Catherine Olea RN at #[phone].    THIS FORM IS A PERMANENT PART OF THE MEDICAL RECORD

## 2024-08-19 NOTE — PAYOR COMM NOTE
--------------  DISCHARGE REVIEW    Payor: [employer]  Subscriber #:  [id number]  Authorization Number: [id number]    Admit date: 8/14/24  Admit time:   3:59 PM  Discharge Date: 8/16/2024  5:27 PM     Admitting Physician: Ben Ramires MD  Attending Physician:  No att. providers found  Primary Care Physician: Troy Banks

## 2024-08-19 NOTE — DISCHARGE SUMMARY
[facility]  Discharge Summary    Alphonso Vidal Patient Status:  Inpatient    1955 MRN [id number]   Location [facility] 4SW-A Attending No att. providers found   Hosp Day # 2 PCP ALPHONSO OJEDA         Admit date: 2024    Discharge date and time: 2024  5:27 PM     Admitting Physician: Ben Ramires MD     Discharge Physician: Ben Ramires MD    Admission Diagnoses: BENIGN PROSTATIC HYPERPLASIA WITH OBSTRUCTION/LOWER URINARY TRACT SYMPTOMS, URINARY RETENTION  Benign localized hyperplasia of prostate with urinary obstruction and lower urinary tract symptoms    Discharge Diagnoses: Same    Admission Condition: good    Discharged Condition: good    Indication for Admission: BPH with LUTS and urinary retention    Hospital Course: Admitted on  and underwent a robotic-assisted lap. Simple prostatectomy.  Upon extubation he had airway issues, requiring reintubation.  He was transferred to the ICU overnight post-operatively.  He had a CT neck the next day which did not show any significant abnormalities. ENT was consulted and were present during extubation on POD1.  He did well from an airway perspective after extubation in the ICU. He was on CBI post-operatively due to expected gross hematuria. This improved and his Cbi was stopped on POD2 and urine remained translucent. He was discharged to home in stable condition on  with his rosales in place. He will follow up in ~1 week for a void trial.    Consults: ICU, ENT    Significant Diagnostic Studies: CT Neck    Treatments: surgery: Robotic-assisted lap. Simple prostatectomy on 2024.    Discharge Exam:  See progress note from the day of discharge.    Disposition: Home or Self Care    Patient Instructions:      Discharge Medications        START taking these medications        Instructions Prescription details   docusate sodium 100 MG Caps  Commonly known as: COLACE      Take 100 mg by mouth 2 (two) times daily as needed for  constipation.   Quantity: 30 capsule  Refills: 0     HYDROcodone-acetaminophen 5-325 MG Tabs  Commonly known as: Norco      Take 1 tablet by mouth every 6 (six) hours as needed.   Quantity: 10 tablet  Refills: 0            CONTINUE taking these medications        Instructions Prescription details   dutasteride 0.5 MG Caps  Commonly known as: Avodart      Take 1 capsule (0.5 mg total) by mouth nightly.   Refills: 0     Sildenafil Citrate 100 MG Tabs  Commonly known as: Viagra      Take 1 tablet by mouth daily as needed for Erectile Dysfunction.   Quantity: 8 tablet  Refills: 11     tamsulosin 0.4 MG Caps  Commonly known as: Flomax      Take 1 capsule (0.4 mg total) by mouth daily.   Refills: 0            ASK your doctor about these medications        Instructions Prescription details   sulfamethoxazole-trimethoprim -160 MG Tabs per tablet  Commonly known as: Bactrim DS  Ask about: Should I take this medication?      Take 1 tablet by mouth 2 (two) times daily for 1 day. Start the morning of rosales catheter removal   Stop taking on: August 17, 2024  Quantity: 2 tablet  Refills: 0               Where to Get Your Medications        These medications were sent to [id number] - [place], [place] - [address] [phone], [phone]  [address] [facility] [address]      Phone: [phone]   docusate sodium 100 MG Caps  HYDROcodone-acetaminophen 5-325 MG Tabs  sulfamethoxazole-trimethoprim -160 MG Tabs per tablet       Activity: activity as tolerated  Diet: regular diet  Wound Care: keep wound clean and dry    Follow-up with Ben Ramires in 1 week.    Signed:  Ben Ramires MD  8/19/2024  4:52 PM

## 2024-10-23 ENCOUNTER — OFFICE VISIT (OUTPATIENT)
Facility: LOCATION | Age: 69
End: 2024-10-23
Payer: COMMERCIAL

## 2024-10-23 DIAGNOSIS — R06.1 STRIDOR: Primary | ICD-10-CM

## 2024-10-23 PROCEDURE — 99213 OFFICE O/P EST LOW 20 MIN: CPT | Performed by: OTOLARYNGOLOGY

## 2024-10-23 PROCEDURE — 1159F MED LIST DOCD IN RCRD: CPT | Performed by: OTOLARYNGOLOGY

## 2024-10-23 PROCEDURE — 1160F RVW MEDS BY RX/DR IN RCRD: CPT | Performed by: OTOLARYNGOLOGY

## 2024-10-23 PROCEDURE — 31575 DIAGNOSTIC LARYNGOSCOPY: CPT | Performed by: OTOLARYNGOLOGY

## 2024-10-23 NOTE — PROGRESS NOTES
Troy Vidal is a 69 year old male.   Chief Complaint   Patient presents with    Throat Problem     HPI:   I saw him in the hospital August.  He had to be reintubated due to upper airway obstruction and stridor.  The tube was then removed and I was at bedside and he done well.  He is doing well at this time.  He is able to lay down to sleep save for some mild snoring at times.  He otherwise exercises well.    REVIEW OF SYSTEMS:   GENERAL HEALTH: feels well otherwise  GENERAL : denies fever, chills, sweats, weight loss, weight gain  SKIN: denies any unusual skin lesions or rashes  RESPIRATORY: denies shortness of breath with exertion  NEURO: denies headaches    EXAM:   There were no vitals taken for this visit.    System Findings Details   Constitutional  Overall appearance - Normal.   Psychiatric  Orientation - Oriented to time, place, person & situation. Appropriate mood and affect.   Head/Face  Facial features -- Normal. Skull - Normal.   Eyes  Pupils equal ,round ,react to light and accomidate   Ears, Nose, Throat, Neck  Ears clear nose clear pharynx clear neck no masses   Neurological  Memory - Normal. Cranial nerves - Cranial nerves II through XII grossly intact.            Flexible Laryngoscopy Procedure Note (24036)    Due to inability for adequate examination of the larynx and need for magnification to perform the examination, endoscopy was performed.  Risks and benefits were discussed with patient/family and they have given verbal consent to proceed.    Pre-operative Diagnosis:   1. Stridor        Post-operative Diagnosis: Same    Procedure: Diagnostic flexible fiberoptic laryngoscopy    Anesthesia: topical lidocaine    Surgeon: Duarte Sears MD    EBL: 0cc    Procedure Detail & Findings: There is some hypertrophy of the base of tongue which sits back more posteriorly than the average person this was likely the cause of his airway obstruction after intubation.  Otherwise epiglottis and vocal cords  are normal with normal mobility.    Condition: Stable    Complications: Patient tolerated the procedure well with no immediate complications.      Duarte Sears MD  ASSESSMENT AND PLAN:   1. Stridor  After intubation in August.  Otherwise the airway looks good save for some mild hypertrophy of the base of tongue lymphoid tissue.  This is likely the cause of his snoring.  Obviously if his snoring symptoms could get worse he could undergo a sleep study.  Otherwise no further testing needed at this time.      The patient indicates understanding of these issues and agrees to the plan.    No follow-ups on file.    Duarte Sears MD  10/23/2024  10:59 AM

## (undated) DEVICE — EVACUATOR SUR 100CC SIL BLB WND

## (undated) DEVICE — LAPAROTOMY SPONGE - RF AND X-RAY DETECTABLE PRE-WASHED: Brand: SITUATE

## (undated) DEVICE — SOLUTION IV 1000ML 0.9% NACL PRESERVATIVE

## (undated) DEVICE — COVER LT HNDL RIG FOR SUR CAM DISP

## (undated) DEVICE — SOLUTION IRRIG 3000ML 0.9% NACL FLX CONT

## (undated) DEVICE — ABSORBABLE HEMOSTAT (OXIDIZED REGENERATED CELLULOSE): Brand: SURGICEL NU-KNIT

## (undated) DEVICE — ABSORBABLE WOUND CLOSURE DEVICE: Brand: V-LOC 90

## (undated) DEVICE — SUT ETHLN 2-0 18IN FS NABSRB BLK 26MM 3/8 CIR

## (undated) DEVICE — MONOPOLAR CURVED SCISSORS: Brand: ENDOWRIST

## (undated) DEVICE — SUT PDS II 0 36IN CT-1 ABSRB VLT L36MM 1/2

## (undated) DEVICE — KIT HEMSTAT MTRX 8ML PORCINE GEL HUM THROM

## (undated) DEVICE — TIP COVER ACCESSORY

## (undated) DEVICE — CANNULA SEAL

## (undated) DEVICE — BLADELESS OBTURATOR: Brand: WECK VISTA

## (undated) DEVICE — TUR Y CYSTO TUBING SET IRRIG L96IN

## (undated) DEVICE — 40580 - THE PINK PAD - ADVANCED TRENDELENBURG POSITIONING KIT: Brand: 40580 - THE PINK PAD - ADVANCED TRENDELENBURG POSITIONING KIT

## (undated) DEVICE — AIRSEAL TRI-LUMEN LILTERED TUBE SET: Brand: AIRSEAL

## (undated) DEVICE — DRAIN SUR 15FR L3/16IN DIA4.7MM SIL RND

## (undated) DEVICE — LARGE NEEDLE DRIVER: Brand: ENDOWRIST

## (undated) DEVICE — Device

## (undated) DEVICE — INSUFFLATION NEEDLE TO ESTABLISH PNEUMOPERITONEUM.: Brand: INSUFFLATION NEEDLE

## (undated) DEVICE — SPONGE GZ 4IN X 4IN RAYON POLY 6 PLY UNIQUE

## (undated) DEVICE — AIRSEAL 12 MM ACCESS PORT AND PALM GRIP OBTURATOR WITH BLADELESS OPTICAL TIP, 120 MM LENGTH: Brand: AIRSEAL

## (undated) DEVICE — GLOVE SUR 7.5 SENSICARE NEOPR PWD F

## (undated) DEVICE — ROBOTIC UROLOGY PROSTATE: Brand: MEDLINE INDUSTRIES, INC.

## (undated) DEVICE — FENESTRATED BIPOLAR FORCEPS: Brand: ENDOWRIST

## (undated) DEVICE — ARM DRAPE

## (undated) DEVICE — VIOLET BRAIDED (POLYGLACTIN 910), SYNTHETIC ABSORBABLE SUTURE: Brand: COATED VICRYL

## (undated) DEVICE — ADHESIVE SKIN TOP FOR WND CLSR DERMBND ADV

## (undated) DEVICE — COLUMN DRAPE

## (undated) DEVICE — ANCHOR TISSUE RETRIEVAL SYSTEM, BAG SIZE 175 ML, PORT SIZE 10 MM: Brand: ANCHOR TISSUE RETRIEVAL SYSTEM

## (undated) DEVICE — LAPAROVUE VISIBILITY SYSTEM LAPAROSCOPIC SOLUTIONS: Brand: LAPAROVUE

## (undated) DEVICE — SUT MCRYL 4-0 18IN PS-2 ABSRB UD 19MM 3/8 CIR

## (undated) DEVICE — SUT PERMA- 0 30IN KS NABSRB BLK 60MM STR R

## (undated) DEVICE — PROGRASP FORCEPS: Brand: ENDOWRIST

## (undated) DEVICE — SLEEVE COMPR MD KNEE LEN SGL USE KENDALL SCD

## (undated) DEVICE — DRAPE,TOP,102X53,STERILE: Brand: MEDLINE

## (undated) NOTE — LETTER
OUTSIDE TESTING RESULT REQUEST     IMPORTANT: FOR YOUR IMMEDIATE ATTENTION  Please FAX all test results listed below to: [phone]     Testing already done on or about: 24     * * * * If testing is NOT complete, arrange with patient A.S.A.P. * * * *      Patient Name: Troy Vidal  Surgery Date: 2024  Medical Record: [id number]  CSN: [id number]  : 1955 - A: 69 y     Sex: male  Surgeon(s):  Ben Ramires MD  Procedure: XI ROBOT-ASSISTED LAPAROSCOPIC SIMPLE PROSTATECTOMY  Anesthesia Type: General     Surgeon: Ben Ramires MD     The following Testing and Time Line are REQUIRED PER ANESTHESIA     UA within  30 days      Thank You,   Sent by:Marya CERDA